# Patient Record
Sex: FEMALE | Race: WHITE | NOT HISPANIC OR LATINO | Employment: OTHER | URBAN - METROPOLITAN AREA
[De-identification: names, ages, dates, MRNs, and addresses within clinical notes are randomized per-mention and may not be internally consistent; named-entity substitution may affect disease eponyms.]

---

## 2020-10-29 ENCOUNTER — OFFICE VISIT (OUTPATIENT)
Dept: ORTHOPEDIC SURGERY | Facility: CLINIC | Age: 65
End: 2020-10-29

## 2020-10-29 VITALS — BODY MASS INDEX: 25.83 KG/M2 | WEIGHT: 136.8 LBS | HEIGHT: 61 IN | HEART RATE: 88 BPM | OXYGEN SATURATION: 99 %

## 2020-10-29 DIAGNOSIS — M65.332 TRIGGER MIDDLE FINGER OF LEFT HAND: ICD-10-CM

## 2020-10-29 DIAGNOSIS — M18.0 PRIMARY OSTEOARTHRITIS OF BOTH FIRST CARPOMETACARPAL JOINTS: ICD-10-CM

## 2020-10-29 DIAGNOSIS — M65.4 DE QUERVAIN'S TENOSYNOVITIS, BILATERAL: Primary | ICD-10-CM

## 2020-10-29 DIAGNOSIS — M65.312 TRIGGER FINGER OF LEFT THUMB: ICD-10-CM

## 2020-10-29 DIAGNOSIS — M65.311 TRIGGER FINGER OF RIGHT THUMB: ICD-10-CM

## 2020-10-29 PROCEDURE — 99204 OFFICE O/P NEW MOD 45 MIN: CPT | Performed by: PHYSICIAN ASSISTANT

## 2020-10-29 PROCEDURE — 20550 NJX 1 TENDON SHEATH/LIGAMENT: CPT | Performed by: PHYSICIAN ASSISTANT

## 2020-10-29 RX ORDER — LISINOPRIL 20 MG/1
20 TABLET ORAL DAILY
COMMUNITY
Start: 2020-08-17 | End: 2023-02-01 | Stop reason: SDUPTHER

## 2020-10-29 RX ADMIN — TRIAMCINOLONE ACETONIDE 20 MG: 40 INJECTION, SUSPENSION INTRA-ARTICULAR; INTRAMUSCULAR at 09:56

## 2020-10-29 RX ADMIN — LIDOCAINE HYDROCHLORIDE 0.5 ML: 10 INJECTION, SOLUTION EPIDURAL; INFILTRATION; INTRACAUDAL; PERINEURAL at 09:56

## 2020-10-29 RX ADMIN — TRIAMCINOLONE ACETONIDE 20 MG: 40 INJECTION, SUSPENSION INTRA-ARTICULAR; INTRAMUSCULAR at 10:12

## 2020-10-29 RX ADMIN — LIDOCAINE HYDROCHLORIDE 1 ML: 10 INJECTION, SOLUTION EPIDURAL; INFILTRATION; INTRACAUDAL; PERINEURAL at 10:12

## 2020-11-02 RX ORDER — TRIAMCINOLONE ACETONIDE 40 MG/ML
20 INJECTION, SUSPENSION INTRA-ARTICULAR; INTRAMUSCULAR
Status: COMPLETED | OUTPATIENT
Start: 2020-10-29 | End: 2020-10-29

## 2020-11-02 RX ORDER — LIDOCAINE HYDROCHLORIDE 10 MG/ML
0.5 INJECTION, SOLUTION EPIDURAL; INFILTRATION; INTRACAUDAL; PERINEURAL
Status: COMPLETED | OUTPATIENT
Start: 2020-10-29 | End: 2020-10-29

## 2020-11-02 RX ORDER — LIDOCAINE HYDROCHLORIDE 10 MG/ML
1 INJECTION, SOLUTION EPIDURAL; INFILTRATION; INTRACAUDAL; PERINEURAL
Status: COMPLETED | OUTPATIENT
Start: 2020-10-29 | End: 2020-10-29

## 2022-08-02 ENCOUNTER — TELEPHONE (OUTPATIENT)
Dept: FAMILY MEDICINE CLINIC | Facility: CLINIC | Age: 67
End: 2022-08-02

## 2022-08-02 NOTE — TELEPHONE ENCOUNTER
Caller: [facility]    Relationship:     Best call back number: [phone]      What form or medical record are you requesting: COPY OF PT EKG     Who is requesting this form or medical record from you: [facility]    How would you like to receive the form or medical records (pick-up, mail, fax):   If fax, what is the fax number: [phone] (YSABEL)  If mail, what is the address:   If pick-up, provide patient with address and location details    Timeframe paperwork needed: ASAP    Additional notes:   PT HAS SURGERY ON 8/9 AND REQUEST COPY OF EKG PRIOR TO SURGERY

## 2023-02-01 RX ORDER — LISINOPRIL 20 MG/1
20 TABLET ORAL DAILY
Qty: 90 TABLET | Refills: 0 | Status: SHIPPED | OUTPATIENT
Start: 2023-02-01

## 2023-09-05 ENCOUNTER — TELEPHONE (OUTPATIENT)
Dept: FAMILY MEDICINE CLINIC | Facility: CLINIC | Age: 68
End: 2023-09-05
Payer: MEDICARE

## 2023-09-05 RX ORDER — LISINOPRIL 20 MG/1
20 TABLET ORAL DAILY
Qty: 90 TABLET | Refills: 0 | Status: SHIPPED | OUTPATIENT
Start: 2023-09-05

## 2023-09-05 NOTE — TELEPHONE ENCOUNTER
Caller: Sowmya Bacon    Relationship: Self    Best call back number: [phone]       What was the call regarding: PATIENT IS RUNNING OUT OF LISINOPRIL. REQUESTING TEMPORARY REFILL UNTIL APPOINTMENT IN OCTOBER. CALL IF THERE ARE ANY ISSUES.     [id number] - [place], [place] - [address] AT NEXT TO [facility] - [phone]  - [phone]     Is it okay if the provider responds through MyChart: NO

## 2023-10-10 ENCOUNTER — OFFICE VISIT (OUTPATIENT)
Dept: FAMILY MEDICINE CLINIC | Facility: CLINIC | Age: 68
End: 2023-10-10
Payer: MEDICARE

## 2023-10-10 VITALS
DIASTOLIC BLOOD PRESSURE: 86 MMHG | TEMPERATURE: 97.9 F | HEIGHT: 61 IN | HEART RATE: 76 BPM | WEIGHT: 138.5 LBS | BODY MASS INDEX: 26.15 KG/M2 | OXYGEN SATURATION: 99 % | RESPIRATION RATE: 18 BRPM | SYSTOLIC BLOOD PRESSURE: 140 MMHG

## 2023-10-10 DIAGNOSIS — Z78.0 POSTMENOPAUSAL: ICD-10-CM

## 2023-10-10 DIAGNOSIS — K21.9 GASTROESOPHAGEAL REFLUX DISEASE WITHOUT ESOPHAGITIS: ICD-10-CM

## 2023-10-10 DIAGNOSIS — R35.1 NOCTURIA: ICD-10-CM

## 2023-10-10 DIAGNOSIS — E55.9 VITAMIN D DEFICIENCY: ICD-10-CM

## 2023-10-10 DIAGNOSIS — J30.1 SEASONAL ALLERGIC RHINITIS DUE TO POLLEN: ICD-10-CM

## 2023-10-10 DIAGNOSIS — Z23 NEED FOR VACCINATION: ICD-10-CM

## 2023-10-10 DIAGNOSIS — I10 ESSENTIAL HYPERTENSION: ICD-10-CM

## 2023-10-10 DIAGNOSIS — Z00.01 ENCOUNTER FOR GENERAL ADULT MEDICAL EXAMINATION WITH ABNORMAL FINDINGS: Primary | ICD-10-CM

## 2023-10-10 DIAGNOSIS — E66.3 OVERWEIGHT (BMI 25.0-29.9): ICD-10-CM

## 2023-10-10 DIAGNOSIS — M54.50 CHRONIC MIDLINE LOW BACK PAIN WITHOUT SCIATICA: ICD-10-CM

## 2023-10-10 DIAGNOSIS — Z12.11 COLON CANCER SCREENING: ICD-10-CM

## 2023-10-10 DIAGNOSIS — E78.2 MIXED HYPERLIPIDEMIA: ICD-10-CM

## 2023-10-10 DIAGNOSIS — R53.83 OTHER FATIGUE: ICD-10-CM

## 2023-10-10 DIAGNOSIS — G89.29 CHRONIC MIDLINE LOW BACK PAIN WITHOUT SCIATICA: ICD-10-CM

## 2023-10-10 DIAGNOSIS — Z13.29 THYROID DISORDER SCREENING: ICD-10-CM

## 2023-10-10 DIAGNOSIS — R73.9 HYPERGLYCEMIA: ICD-10-CM

## 2023-10-10 RX ORDER — MONTELUKAST SODIUM 10 MG/1
10 TABLET ORAL NIGHTLY
Qty: 90 TABLET | Refills: 0 | Status: SHIPPED | OUTPATIENT
Start: 2023-10-10

## 2023-10-10 RX ORDER — LISINOPRIL AND HYDROCHLOROTHIAZIDE 20; 12.5 MG/1; MG/1
1 TABLET ORAL DAILY
Qty: 90 TABLET | Refills: 1 | Status: SHIPPED | OUTPATIENT
Start: 2023-10-10

## 2023-10-10 RX ORDER — CETIRIZINE HYDROCHLORIDE 10 MG/1
10 TABLET ORAL DAILY
COMMUNITY

## 2023-10-10 RX ORDER — MELATONIN
1000 DAILY
COMMUNITY

## 2023-10-10 NOTE — PROGRESS NOTES
Venipuncture Blood Specimen Collection  Venipuncture performed in left arm by Danielle Blackburn MA with good hemostasis. Patient tolerated the procedure well without complications.   10/10/23   Danielle Blackburn MA

## 2023-10-10 NOTE — ASSESSMENT & PLAN NOTE
Modestly overweight, still quite a healthy weight as per body frame.  Nonetheless reinforced importance of healthy diet, exercise and even modest weight loss.

## 2023-10-10 NOTE — ASSESSMENT & PLAN NOTE
Seasonal pattern with modest benefit over-the-counter antihistamine, typically Zyrtec.  Some breakthrough symptoms, I have added Singulair 10 mg tablet daily to regimen and she could also use Flonase although sometimes it is irritating.  He also all 3 together for the next couple weeks, then as needed.  Additional benefit of saline spray, nasal flushing.  Advise concerns.

## 2023-10-10 NOTE — ASSESSMENT & PLAN NOTE
Long-standing pattern.  Historically responsive to preventative measures with once monthly chiropractor, heating pack, anti-inflammatories was not satisfactory as of late 2021 and in 2022, with progression of symptoms.  Ultimately evaluation by Dr. Edmundo Mckeon at [facility] who performed MRI with abnormalities and ultimately perform surgical L4/L5 fusion on 8/9/2022.  He has done well since, advise recurrence.

## 2023-10-10 NOTE — ASSESSMENT & PLAN NOTE
Blood work last 7/13/2019, tried multiple times in the interim, obtained finally today 10/10/2023 with managed per results.  No recent Pap smear, patient still plans to set up appointment with gynecologist Dr. Cullen for Pap smear, I have discussed this would likely be her final if negative.  Mammogram recommended, patient will consider and if she pursue she will do at the same day she has her DEXA scan.  DEXA scan 11/20/2016 at [facility] with osteopenia not requiring treatment, patient agreeable to repeating today as of 10/10/2023.  TDaP vaccine given 6/30/2022, ABN completed.  Pneumococcal 13 valent vaccine given 4/8/2019, pneumococcal 23 valent vaccine given 12/14/2020.  Recommend 2 part shingles vaccine.  Flu vaccine declined.  Colonoscopy last 2007 by Dr. Jimenez, which is normal with 10 year followup.  Patient subsequently declined colonoscopy, but normal Cologuard 12/28/2020, repeat 3 years pending soon and schedule today 10/10/2023.

## 2023-10-10 NOTE — ASSESSMENT & PLAN NOTE
9/28/2019 total cholesterol 177, triglycerides 35, HDL 79, LDL 91.  Comparison 7/13/2019 with total cholesterol 321, triglycerides 49, HDL 78, .  Previous pravastatin 20 mg daily was being taken intermittently but with this notable elevation, switched atorvastatin 40 mg daily which has had excellent response as noted above on 9/28/2019, but she has discontinued, instead using over-the-counter supplement.  Recheck planned for couple years but not yet obtained, recheck finally today on 10/10/2023, still elevated we will plan to resume atorvastatin.   Recommendation healthy dietary intake, activity level, weight loss, addition discuss.

## 2023-10-10 NOTE — ASSESSMENT & PLAN NOTE
Continue on vitamin D 1000 units daily OTC.  No recent measurement to compare, we will check with screening blood work.  History of normal DEXA scan in 2007, with osteopenia pattern not requiring treatment 11/22/2016 as ordered by gynecology at [facility].  Management per results.

## 2023-10-10 NOTE — ASSESSMENT & PLAN NOTE
Infrequent flare without any sticking sensation in the throat. uses over-the-counter medication with benefit.  Reflux precautions reinforced.  Advise concerns.

## 2023-10-10 NOTE — PROGRESS NOTES
The ABCs of the Annual Wellness Visit  Subsequent Medicare Wellness Visit    Subjective    Sowmya Bacon is a 68 y.o. female who presents for a Subsequent Medicare Wellness Visit.    The following portions of the patient's history were reviewed and   updated as appropriate: allergies, current medications, past family history, past medical history, past social history, past surgical history, and problem list.    Compared to one year ago, the patient feels her physical   health is the same.    Compared to one year ago, the patient feels her mental   health is the same.    Recent Hospitalizations:  She was not admitted to the hospital during the last year.       Current Medical Providers:  Patient Care Team:  Gino Boggs MD as PCP - General (Internal Medicine)    Outpatient Medications Prior to Visit   Medication Sig Dispense Refill    lisinopril (PRINIVIL,ZESTRIL) 20 MG tablet Take 1 tablet by mouth Daily. 90 tablet 0    cetirizine (zyrTEC) 10 MG tablet Take 1 tablet by mouth Daily.      Cholecalciferol 25 MCG (1000 UT) tablet Take 1 tablet by mouth Daily.       No facility-administered medications prior to visit.       No opioid medication identified on active medication list. I have reviewed chart for other potential  high risk medication/s and harmful drug interactions in the elderly.        Aspirin is not on active medication list.  Aspirin use is not indicated based on review of current medical condition/s. Risk of harm outweighs potential benefits.  .    Patient Active Problem List   Diagnosis    Encounter for general adult medical examination with abnormal findings    Essential hypertension    Mixed hyperlipidemia    Seasonal allergic rhinitis due to pollen    Chronic midline low back pain without sciatica    Gastroesophageal reflux disease without esophagitis    Vitamin D deficiency    Need for vaccination    Colon cancer screening    Overweight (BMI 25.0-29.9)     Advance Care Planning   Advance Care  "Planning     Advance Directive is not on file.  ACP discussion was held with the patient during this visit. Patient has an advance directive (not in EMR), copy requested.     Objective    Vitals:    10/10/23 0929   BP: 140/86   BP Location: Left arm   Patient Position: Sitting   Cuff Size: Adult   Pulse: 76   Resp: 18   Temp: 97.9 øF (36.6 øC)   TempSrc: Temporal   SpO2: 99%   Weight: 62.8 kg (138 lb 8 oz)   Height: 154.9 cm (61\")     Estimated body mass index is 26.17 kg/mý as calculated from the following:    Height as of this encounter: 154.9 cm (61\").    Weight as of this encounter: 62.8 kg (138 lb 8 oz).    BMI is >= 25 and <30. (Overweight) The following options were offered after discussion;: weight loss educational material (shared in after visit summary), exercise counseling/recommendations, and nutrition counseling/recommendations      Does the patient have evidence of cognitive impairment? No          HEALTH RISK ASSESSMENT    Smoking Status:  Social History     Tobacco Use   Smoking Status Never   Smokeless Tobacco Never     Alcohol Consumption:  Social History     Substance and Sexual Activity   Alcohol Use Yes    Comment: occ     Fall Risk Screen:    STEADI Fall Risk Assessment was completed, and patient is at MODERATE risk for falls. Assessment completed on:10/10/2023    Depression Screening:      10/10/2023     9:29 AM   PHQ-2/PHQ-9 Depression Screening   Little Interest or Pleasure in Doing Things 0-->not at all   Feeling Down, Depressed or Hopeless 0-->not at all   PHQ-9: Brief Depression Severity Measure Score 0       Health Habits and Functional and Cognitive Screening:      10/10/2023     9:32 AM   Functional & Cognitive Status   Do you have difficulty preparing food and eating? No   Do you have difficulty bathing yourself, getting dressed or grooming yourself? No   Do you have difficulty using the toilet? No   Do you have difficulty moving around from place to place? No   Do you have trouble " with steps or getting out of a bed or a chair? No   Current Diet Well Balanced Diet   Dental Exam Up to date   Eye Exam Up to date   Exercise (times per week) 7 times per week   Current Exercises Include Walking;Weightlifting   Do you need help using the phone?  No   Are you deaf or do you have serious difficulty hearing?  No   Do you need help to go to places out of walking distance? No   Do you need help shopping? No   Do you need help preparing meals?  No   Do you need help with housework?  No   Do you need help with laundry? No   Do you need help taking your medications? No   Do you need help managing money? No   Do you ever drive or ride in a car without wearing a seat belt? No       Age-appropriate Screening Schedule:  Refer to the list below for future screening recommendations based on patient's age, sex and/or medical conditions. Orders for these recommended tests are listed in the plan section. The patient has been provided with a written plan.    Health Maintenance   Topic Date Due    MAMMOGRAM  Never done    DXA SCAN  Never done    BMI FOLLOWUP  Never done    COVID-19 Vaccine (1) Never done    ZOSTER VACCINE (1 of 2) Never done    HEPATITIS C SCREENING  Never done    INFLUENZA VACCINE  Never done    LIPID PANEL  Never done    COLORECTAL CANCER SCREENING  12/28/2023    ANNUAL WELLNESS VISIT  10/10/2024    TDAP/TD VACCINES (3 - Td or Tdap) 06/30/2032    Pneumococcal Vaccine 65+  Completed                  CMS Preventative Services Quick Reference  Risk Factors Identified During Encounter  None Identified  The above risks/problems have been discussed with the patient.  Pertinent information has been shared with the patient in the After Visit Summary.  An After Visit Summary and PPPS were made available to the patient.    Follow Up:   Next Medicare Wellness visit to be scheduled in 1 year.       Additional E&M Note during same encounter follows:  Patient has multiple medical problems which are significant  "and separately identifiable that require additional work above and beyond the Medicare Wellness Visit.      Chief Complaint  Annual Exam    Subjective        HPI  Sowmya aBcon is also being seen today for wellness visit in addition complete physical exam and follow-up regarding multimedical problems.  Regarding hypertension, blood pressure not checked at home of any regularity, but in clinic today on recheck it still 140/84.  As such we discussed titrating up her medicine modestly and having her check more regular at home.  With hyperlipidemia pattern, she is off medicine with the last time recommended to recheck cholesterol and consider restarting the atorvastatin, but she never had that blood work done.  As such we will proceed today and initiate as appropriate.  Nonetheless with comorbid mild overweight pattern, reinforced importance of healthy diet, exercise and weight loss.  Regarding lower back pain, status post August 2022 fusion of the L4/L5 region with good response per Dr. Mckeon in [place].  No radiculopathy pattern.  Periodic aches and pains and sense of tightness but much improved overall.  Reflux symptoms periodically flare with over-the-counter use of medication but not too bothersome.  She does have notable increase in congestion drainage, sneezing, for which she is using over-the-counter antihistamine but nothing else for allergy type symptoms.  Regarding vitamin D deficiency she is still taking her medicine of some regularity, we will recheck with blood work.         Objective   Vital Signs:  /86 (BP Location: Left arm, Patient Position: Sitting, Cuff Size: Adult)   Pulse 76   Temp 97.9 øF (36.6 øC) (Temporal)   Resp 18   Ht 154.9 cm (61\")   Wt 62.8 kg (138 lb 8 oz)   SpO2 99%   BMI 26.17 kg/mý     Physical Exam  Constitutional:       General: She is not in acute distress.     Appearance: Normal appearance. She is not ill-appearing, toxic-appearing or diaphoretic.   HENT:      Head: " Normocephalic and atraumatic.      Right Ear: Ear canal and external ear normal.      Left Ear: Ear canal and external ear normal.      Ears:      Comments: Mild fluid behind the TMs bilaterally, otherwise clear     Nose: Rhinorrhea present.      Comments: Mild to moderate clear rhinorrhea     Mouth/Throat:      Mouth: Mucous membranes are moist.      Pharynx: Oropharynx is clear. No oropharyngeal exudate or posterior oropharyngeal erythema.   Eyes:      Extraocular Movements: Extraocular movements intact.      Conjunctiva/sclera: Conjunctivae normal.      Pupils: Pupils are equal, round, and reactive to light.   Neck:      Vascular: No carotid bruit.   Cardiovascular:      Rate and Rhythm: Normal rate and regular rhythm.      Pulses: Normal pulses.      Heart sounds: Normal heart sounds. No murmur heard.     No friction rub. No gallop.   Pulmonary:      Effort: Pulmonary effort is normal. No respiratory distress.      Breath sounds: Normal breath sounds. No stridor. No wheezing.   Abdominal:      General: Abdomen is flat. Bowel sounds are normal. There is no distension.      Palpations: Abdomen is soft. There is no mass.      Tenderness: There is no abdominal tenderness. There is no guarding or rebound.      Hernia: No hernia is present.   Genitourinary:     Comments: Patient defers as obtained through gynecology  Musculoskeletal:      Cervical back: Neck supple. No tenderness.      Right lower leg: No edema.      Left lower leg: No edema.   Lymphadenopathy:      Cervical: No cervical adenopathy.   Skin:     General: Skin is warm and dry.      Capillary Refill: Capillary refill takes less than 2 seconds.   Neurological:      General: No focal deficit present.      Mental Status: She is alert and oriented to person, place, and time. Mental status is at baseline.   Psychiatric:         Mood and Affect: Mood normal.         Behavior: Behavior normal.         Thought Content: Thought content normal.                   ECG 12 Lead    Date/Time: 10/10/2023 10:44 AM  Performed by: Gino Boggs MD    Authorized by: Gino Boggs MD  Comparison: compared with previous ECG from 6/30/2022  Similar to previous ECG  Rhythm: sinus rhythm  Rate: normal  Conduction: conduction normal  ST Segments: ST segments normal  T Waves: T waves normal  QRS axis: normal  Other: no other findings    Clinical impression: normal ECG  Comments: Computer not recognizing P waves but they are present and normal intervals, as such this is actually a normal sinus rhythm, and unchanged compared to 6/30/2022 EKG.              Assessment and Plan   Diagnoses and all orders for this visit:    1. Encounter for general adult medical examination with abnormal findings (Primary)  Assessment & Plan:  Blood work last 7/13/2019, tried multiple times in the interim, obtained finally today 10/10/2023 with managed per results.  No recent Pap smear, patient still plans to set up appointment with gynecologist Dr. Cullen for Pap smear, I have discussed this would likely be her final if negative.  Mammogram recommended, patient will consider and if she pursue she will do at the same day she has her DEXA scan.  DEXA scan 11/20/2016 at [facility] with osteopenia not requiring treatment, patient agreeable to repeating today as of 10/10/2023.  TDaP vaccine given 6/30/2022, ABN completed.  Pneumococcal 13 valent vaccine given 4/8/2019, pneumococcal 23 valent vaccine given 12/14/2020.  Recommend 2 part shingles vaccine.  Flu vaccine declined.  Colonoscopy last 2007 by Dr. Jimenez, which is normal with 10 year followup.  Patient subsequently declined colonoscopy, but normal Cologuard 12/28/2020, repeat 3 years pending soon and schedule today 10/10/2023.       2. Chronic midline low back pain without sciatica  Assessment & Plan:  Long-standing pattern.  Historically responsive to preventative measures with once monthly chiropractor, heating pack, anti-inflammatories  was not satisfactory as of late 2021 and in 2022, with progression of symptoms.  Ultimately evaluation by Dr. Edmundo Mckeon at [facility] who performed MRI with abnormalities and ultimately perform surgical L4/L5 fusion on 8/9/2022.  He has done well since, advise recurrence.      3. Essential hypertension  Assessment & Plan:  Current lisinopril 20 mg daily as but blood pressure modestly increased, I would like to return to lisinopril 20/HCTZ 12.5 mg daily although there was a modest lower sodium in the past on this medication.  EKG non-concerning on 10/10/2023, with notable computer not recognizing the P waves but they are present, and unchanged compared to 6/30/2022, 12/14/2020, 4/8/2019.  No associated lightheadedness, dizziness, chest pain or palpitation.      Orders:  -     ECG 12 Lead  -     lisinopril-hydrochlorothiazide (PRINZIDE,ZESTORETIC) 20-12.5 MG per tablet; Take 1 tablet by mouth Daily.  Dispense: 90 tablet; Refill: 1    4. Mixed hyperlipidemia  Assessment & Plan:  9/28/2019 total cholesterol 177, triglycerides 35, HDL 79, LDL 91.  Comparison 7/13/2019 with total cholesterol 321, triglycerides 49, HDL 78, .  Previous pravastatin 20 mg daily was being taken intermittently but with this notable elevation, switched atorvastatin 40 mg daily which has had excellent response as noted above on 9/28/2019, but she has discontinued, instead using over-the-counter supplement.  Recheck planned for couple years but not yet obtained, recheck finally today on 10/10/2023, still elevated we will plan to resume atorvastatin.   Recommendation healthy dietary intake, activity level, weight loss, addition discuss.    Orders:  -     Comprehensive Metabolic Panel; Future  -     Lipid Panel; Future  -     Lipid Panel  -     Comprehensive Metabolic Panel    5. Seasonal allergic rhinitis due to pollen  Assessment & Plan:  Seasonal pattern with modest benefit over-the-counter antihistamine, typically Zyrtec.   Some breakthrough symptoms, I have added Singulair 10 mg tablet daily to regimen and she could also use Flonase although sometimes it is irritating.  He also all 3 together for the next couple weeks, then as needed.  Additional benefit of saline spray, nasal flushing.  Advise concerns.    Orders:  -     montelukast (Singulair) 10 MG tablet; Take 1 tablet by mouth Every Night.  Dispense: 90 tablet; Refill: 0    6. Gastroesophageal reflux disease without esophagitis  Assessment & Plan:  Infrequent flare without any sticking sensation in the throat. uses over-the-counter medication with benefit.  Reflux precautions reinforced.  Advise concerns.      7. Vitamin D deficiency  Assessment & Plan:  Continue on vitamin D 1000 units daily OTC.  No recent measurement to compare, we will check with screening blood work.  History of normal DEXA scan in 2007, with osteopenia pattern not requiring treatment 11/22/2016 as ordered by gynecology at [facility].  Management per results.    Orders:  -     Vitamin D,25-Hydroxy; Future  -     Vitamin D,25-Hydroxy    8. Thyroid disorder screening  -     TSH Rfx On Abnormal To Free T4; Future  -     TSH Rfx On Abnormal To Free T4    9. Other fatigue  -     CBC & Differential; Future  -     Hepatitis C Antibody; Future  -     HIV-1 / O / 2 Ag / Antibody; Future  -     HIV-1 / O / 2 Ag / Antibody  -     Hepatitis C Antibody  -     CBC & Differential    10. Nocturia  -     Urinalysis With Culture If Indicated - Urine, Clean Catch; Future  -     Urinalysis With Culture If Indicated - Urine, Clean Catch    11. Hyperglycemia  -     Hemoglobin A1c; Future  -     Hemoglobin A1c    12. Need for vaccination    13. Colon cancer screening  Assessment & Plan:  Colonoscopy 2007 by Dr. Jimenez normal 10-year follow-up, then Cologuard - 12/28/2020, repeat to be scheduled as of 10/9/2023.    Orders:  -     Cologuard - Stool, Per Rectum; Future    14. Postmenopausal  -     DEXA Bone Density Axial;  Future    15. Overweight (BMI 25.0-29.9)  Assessment & Plan:  Modestly overweight, still quite a healthy weight as per body frame.  Nonetheless reinforced importance of healthy diet, exercise and even modest weight loss.               Follow Up   Return in about 3 months (around 1/10/2024) for Next scheduled follow up.  Patient was given instructions and counseling regarding her condition or for health maintenance advice. Please see specific information pulled into the AVS if appropriate.

## 2023-10-10 NOTE — PATIENT INSTRUCTIONS
Health Maintenance, Female  Adopting a healthy lifestyle and getting preventive care can go a long way to promote health and wellness. Talk with your health care provider about what schedule of regular examinations is right for you. This is a good chance for you to check in with your provider about disease prevention and staying healthy.  In between checkups, there are plenty of things you can do on your own. Experts have done a lot of research about which lifestyle changes and preventive measures are most likely to keep you healthy. Ask your health care provider for more information.  Weight and diet  Eat a healthy diet  Be sure to include plenty of vegetables, fruits, low-fat dairy products, and lean protein.  Do not eat a lot of foods high in solid fats, added sugars, or salt.  Get regular exercise. This is one of the most important things you can do for your health.  Most adults should exercise for at least 150 minutes each week. The exercise should increase your heart rate and make you sweat (moderate-intensity exercise).  Most adults should also do strengthening exercises at least twice a week. This is in addition to the moderate-intensity exercise.     Maintain a healthy weight  Body mass index (BMI) is a measurement that can be used to identify possible weight problems. It estimates body fat based on height and weight. Your health care provider can help determine your BMI and help you achieve or maintain a healthy weight.  For females 20 years of age and older:  A BMI below 18.5 is considered underweight.  A BMI of 18.5 to 24.9 is normal.  A BMI of 25 to 29.9 is considered overweight.  A BMI of 30 and above is considered obese.     Watch levels of cholesterol and blood lipids  You should start having your blood tested for lipids and cholesterol at 20 years of age, then have this test every 5 years.  You may need to have your cholesterol levels checked more often if:  Your lipid or cholesterol levels are  high.  You are older than 50 years of age.  You are at high risk for heart disease.     Cancer screening  Lung Cancer  Lung cancer screening is recommended for adults 55-80 years old who are at high risk for lung cancer because of a history of smoking.  A yearly low-dose CT scan of the lungs is recommended for people who:  Currently smoke.  Have quit within the past 15 years.  Have at least a 30-pack-year history of smoking. A pack year is smoking an average of one pack of cigarettes a day for 1 year.  Yearly screening should continue until it has been 15 years since you quit.  Yearly screening should stop if you develop a health problem that would prevent you from having lung cancer treatment.     Breast Cancer  Practice breast self-awareness. This means understanding how your breasts normally appear and feel.  It also means doing regular breast self-exams. Let your health care provider know about any changes, no matter how small.  If you are in your 20s or 30s, you should have a clinical breast exam (CBE) by a health care provider every 1-3 years as part of a regular health exam.  If you are 40 or older, have a CBE every year. Also consider having a breast X-ray (mammogram) every year.  If you have a family history of breast cancer, talk to your health care provider about genetic screening.  If you are at high risk for breast cancer, talk to your health care provider about having an MRI and a mammogram every year.  Breast cancer gene (BRCA) assessment is recommended for women who have family members with BRCA-related cancers. BRCA-related cancers include:  Breast.  Ovarian.  Tubal.  Peritoneal cancers.  Results of the assessment will determine the need for genetic counseling and BRCA1 and BRCA2 testing.     Cervical Cancer  Your health care provider may recommend that you be screened regularly for cancer of the pelvic organs (ovaries, uterus, and vagina). This screening involves a pelvic examination, including  checking for microscopic changes to the surface of your cervix (Pap test). You may be encouraged to have this screening done every 3 years, beginning at age 21.  For women ages 30-65, health care providers may recommend pelvic exams and Pap testing every 3 years, or they may recommend the Pap and pelvic exam, combined with testing for human papilloma virus (HPV), every 5 years. Some types of HPV increase your risk of cervical cancer. Testing for HPV may also be done on women of any age with unclear Pap test results.  Other health care providers may not recommend any screening for nonpregnant women who are considered low risk for pelvic cancer and who do not have symptoms. Ask your health care provider if a screening pelvic exam is right for you.  If you have had past treatment for cervical cancer or a condition that could lead to cancer, you need Pap tests and screening for cancer for at least 20 years after your treatment. If Pap tests have been discontinued, your risk factors (such as having a new sexual partner) need to be reassessed to determine if screening should resume. Some women have medical problems that increase the chance of getting cervical cancer. In these cases, your health care provider may recommend more frequent screening and Pap tests.     Colorectal Cancer  This type of cancer can be detected and often prevented.  Routine colorectal cancer screening usually begins at 50 years of age and continues through 75 years of age.  Your health care provider may recommend screening at an earlier age if you have risk factors for colon cancer.  Your health care provider may also recommend using home test kits to check for hidden blood in the stool.  A small camera at the end of a tube can be used to examine your colon directly (sigmoidoscopy or colonoscopy). This is done to check for the earliest forms of colorectal cancer.  Routine screening usually begins at age 50.  Direct examination of the colon should  be repeated every 5-10 years through 75 years of age. However, you may need to be screened more often if early forms of precancerous polyps or small growths are found.     Skin Cancer  Check your skin from head to toe regularly.  Tell your health care provider about any new moles or changes in moles, especially if there is a change in a mole's shape or color.  Also tell your health care provider if you have a mole that is larger than the size of a pencil eraser.  Always use sunscreen. Apply sunscreen liberally and repeatedly throughout the day.  Protect yourself by wearing long sleeves, pants, a wide-brimmed hat, and sunglasses whenever you are outside.     Heart disease, diabetes, and high blood pressure  High blood pressure causes heart disease and increases the risk of stroke. High blood pressure is more likely to develop in:  People who have blood pressure in the high end of the normal range (130-139/85-89 mm Hg).  People who are overweight or obese.  People who are .  If you are 18-39 years of age, have your blood pressure checked every 3-5 years. If you are 40 years of age or older, have your blood pressure checked every year. You should have your blood pressure measured twice--once when you are at a hospital or clinic, and once when you are not at a hospital or clinic. Record the average of the two measurements. To check your blood pressure when you are not at a hospital or clinic, you can use:  An automated blood pressure machine at a pharmacy.  A home blood pressure monitor.  If you are between 55 years and 79 years old, ask your health care provider if you should take aspirin to prevent strokes.  Have regular diabetes screenings. This involves taking a blood sample to check your fasting blood sugar level.  If you are at a normal weight and have a low risk for diabetes, have this test once every three years after 45 years of age.  If you are overweight and have a high risk for diabetes,  consider being tested at a younger age or more often.  Preventing infection  Hepatitis B  If you have a higher risk for hepatitis B, you should be screened for this virus. You are considered at high risk for hepatitis B if:  You were born in a country where hepatitis B is common. Ask your health care provider which countries are considered high risk.  Your parents were born in a high-risk country, and you have not been immunized against hepatitis B (hepatitis B vaccine).  You have HIV or AIDS.  You use needles to inject street drugs.  You live with someone who has hepatitis B.  You have had sex with someone who has hepatitis B.  You get hemodialysis treatment.  You take certain medicines for conditions, including cancer, organ transplantation, and autoimmune conditions.     Hepatitis C  Blood testing is recommended for:  Everyone born from 1945 through 1965.  Anyone with known risk factors for hepatitis C.     Sexually transmitted infections (STIs)  You should be screened for sexually transmitted infections (STIs) including gonorrhea and chlamydia if:  You are sexually active and are younger than 24 years of age.  You are older than 24 years of age and your health care provider tells you that you are at risk for this type of infection.  Your sexual activity has changed since you were last screened and you are at an increased risk for chlamydia or gonorrhea. Ask your health care provider if you are at risk.  If you do not have HIV, but are at risk, it may be recommended that you take a prescription medicine daily to prevent HIV infection. This is called pre-exposure prophylaxis (PrEP). You are considered at risk if:  You are sexually active and do not regularly use condoms or know the HIV status of your partner(s).  You take drugs by injection.  You are sexually active with a partner who has HIV.     Talk with your health care provider about whether you are at high risk of being infected with HIV. If you choose to  begin PrEP, you should first be tested for HIV. You should then be tested every 3 months for as long as you are taking PrEP.  Pregnancy  If you are premenopausal and you may become pregnant, ask your health care provider about preconception counseling.  If you may become pregnant, take 400 to 800 micrograms (mcg) of folic acid every day.  If you want to prevent pregnancy, talk to your health care provider about birth control (contraception).  Osteoporosis and menopause  Osteoporosis is a disease in which the bones lose minerals and strength with aging. This can result in serious bone fractures. Your risk for osteoporosis can be identified using a bone density scan.  If you are 65 years of age or older, or if you are at risk for osteoporosis and fractures, ask your health care provider if you should be screened.  Ask your health care provider whether you should take a calcium or vitamin D supplement to lower your risk for osteoporosis.  Menopause may have certain physical symptoms and risks.  Hormone replacement therapy may reduce some of these symptoms and risks.  Talk to your health care provider about whether hormone replacement therapy is right for you.  Follow these instructions at home:  Schedule regular health, dental, and eye exams.  Stay current with your immunizations.  Do not use any tobacco products including cigarettes, chewing tobacco, or electronic cigarettes.  If you are pregnant, do not drink alcohol.  If you are breastfeeding, limit how much and how often you drink alcohol.  Limit alcohol intake to no more than 1 drink per day for nonpregnant women. One drink equals 12 ounces of beer, 5 ounces of wine, or 1« ounces of hard liquor.  Do not use street drugs.  Do not share needles.  Ask your health care provider for help if you need support or information about quitting drugs.  Tell your health care provider if you often feel depressed.  Tell your health care provider if you have ever been abused or do  not feel safe at home.  This information is not intended to replace advice given to you by your health care provider. Make sure you discuss any questions you have with your health care provider.  Document Released: 07/02/2012 Document Revised: 05/25/2017 Document Reviewed: 09/20/2016  [employer] Education c 2018 [employer]

## 2023-10-10 NOTE — ASSESSMENT & PLAN NOTE
Current lisinopril 20 mg daily as but blood pressure modestly increased, I would like to return to lisinopril 20/HCTZ 12.5 mg daily although there was a modest lower sodium in the past on this medication.  EKG non-concerning on 10/10/2023, with notable computer not recognizing the P waves but they are present, and unchanged compared to 6/30/2022, 12/14/2020, 4/8/2019.  No associated lightheadedness, dizziness, chest pain or palpitation.

## 2023-10-10 NOTE — ASSESSMENT & PLAN NOTE
Colonoscopy 2007 by Dr. Jimenez normal 10-year follow-up, then Cologuard - 12/28/2020, repeat to be scheduled as of 10/9/2023.

## 2023-10-11 LAB
25(OH)D3+25(OH)D2 SERPL-MCNC: 62 NG/ML (ref 30–100)
ALBUMIN SERPL-MCNC: 4.5 G/DL (ref 3.9–4.9)
ALBUMIN/GLOB SERPL: 2 {RATIO} (ref 1.2–2.2)
ALP SERPL-CCNC: 72 IU/L (ref 44–121)
ALT SERPL-CCNC: 27 IU/L (ref 0–32)
APPEARANCE UR: CLEAR
AST SERPL-CCNC: 28 IU/L (ref 0–40)
BACTERIA #/AREA URNS HPF: NORMAL /[HPF]
BASOPHILS # BLD AUTO: 0 X10E3/UL (ref 0–0.2)
BASOPHILS NFR BLD AUTO: 1 %
BILIRUB SERPL-MCNC: 0.3 MG/DL (ref 0–1.2)
BILIRUB UR QL STRIP: NEGATIVE
BUN SERPL-MCNC: 11 MG/DL (ref 8–27)
BUN/CREAT SERPL: 15 (ref 12–28)
CALCIUM SERPL-MCNC: 9.9 MG/DL (ref 8.7–10.3)
CASTS URNS QL MICRO: NORMAL /LPF
CHLORIDE SERPL-SCNC: 98 MMOL/L (ref 96–106)
CHOLEST SERPL-MCNC: 263 MG/DL (ref 100–199)
CO2 SERPL-SCNC: 21 MMOL/L (ref 20–29)
COLOR UR: YELLOW
CREAT SERPL-MCNC: 0.74 MG/DL (ref 0.57–1)
EGFRCR SERPLBLD CKD-EPI 2021: 87 ML/MIN/1.73
EOSINOPHIL # BLD AUTO: 0.1 X10E3/UL (ref 0–0.4)
EOSINOPHIL NFR BLD AUTO: 2 %
EPI CELLS #/AREA URNS HPF: NORMAL /HPF (ref 0–10)
ERYTHROCYTE [DISTWIDTH] IN BLOOD BY AUTOMATED COUNT: 12.7 % (ref 11.7–15.4)
GLOBULIN SER CALC-MCNC: 2.2 G/DL (ref 1.5–4.5)
GLUCOSE SERPL-MCNC: 94 MG/DL (ref 70–99)
GLUCOSE UR QL STRIP: NEGATIVE
HBA1C MFR BLD: 5.9 % (ref 4.8–5.6)
HCT VFR BLD AUTO: 39.1 % (ref 34–46.6)
HCV IGG SERPL QL IA: NON REACTIVE
HDLC SERPL-MCNC: 72 MG/DL
HGB BLD-MCNC: 12.9 G/DL (ref 11.1–15.9)
HGB UR QL STRIP: NEGATIVE
HIV 1+2 AB+HIV1 P24 AG SERPL QL IA: NON REACTIVE
IMM GRANULOCYTES # BLD AUTO: 0 X10E3/UL (ref 0–0.1)
IMM GRANULOCYTES NFR BLD AUTO: 0 %
KETONES UR QL STRIP: NEGATIVE
LDLC SERPL CALC-MCNC: 176 MG/DL (ref 0–99)
LEUKOCYTE ESTERASE UR QL STRIP: NEGATIVE
LYMPHOCYTES # BLD AUTO: 2 X10E3/UL (ref 0.7–3.1)
LYMPHOCYTES NFR BLD AUTO: 39 %
MCH RBC QN AUTO: 29.7 PG (ref 26.6–33)
MCHC RBC AUTO-ENTMCNC: 33 G/DL (ref 31.5–35.7)
MCV RBC AUTO: 90 FL (ref 79–97)
MICRO URNS: NORMAL
MICRO URNS: NORMAL
MONOCYTES # BLD AUTO: 0.4 X10E3/UL (ref 0.1–0.9)
MONOCYTES NFR BLD AUTO: 8 %
NEUTROPHILS # BLD AUTO: 2.6 X10E3/UL (ref 1.4–7)
NEUTROPHILS NFR BLD AUTO: 50 %
NITRITE UR QL STRIP: NEGATIVE
PH UR STRIP: 6.5 [PH] (ref 5–7.5)
PLATELET # BLD AUTO: 381 X10E3/UL (ref 150–450)
POTASSIUM SERPL-SCNC: 4.6 MMOL/L (ref 3.5–5.2)
PROT SERPL-MCNC: 6.7 G/DL (ref 6–8.5)
PROT UR QL STRIP: NEGATIVE
RBC # BLD AUTO: 4.34 X10E6/UL (ref 3.77–5.28)
RBC #/AREA URNS HPF: NORMAL /HPF (ref 0–2)
SODIUM SERPL-SCNC: 134 MMOL/L (ref 134–144)
SP GR UR STRIP: 1.01 (ref 1–1.03)
TRIGL SERPL-MCNC: 87 MG/DL (ref 0–149)
TSH SERPL DL<=0.005 MIU/L-ACNC: 3.21 UIU/ML (ref 0.45–4.5)
URINALYSIS REFLEX: NORMAL
UROBILINOGEN UR STRIP-MCNC: 0.2 MG/DL (ref 0.2–1)
VLDLC SERPL CALC-MCNC: 15 MG/DL (ref 5–40)
WBC # BLD AUTO: 5.2 X10E3/UL (ref 3.4–10.8)
WBC #/AREA URNS HPF: NORMAL /HPF (ref 0–5)

## 2023-10-12 ENCOUNTER — TELEPHONE (OUTPATIENT)
Dept: FAMILY MEDICINE CLINIC | Facility: CLINIC | Age: 68
End: 2023-10-12
Payer: MEDICARE

## 2023-10-12 DIAGNOSIS — E78.2 MIXED HYPERLIPIDEMIA: Primary | ICD-10-CM

## 2023-10-12 RX ORDER — ATORVASTATIN CALCIUM 40 MG/1
40 TABLET, FILM COATED ORAL DAILY
Qty: 90 TABLET | Refills: 1 | Status: SHIPPED | OUTPATIENT
Start: 2023-10-12

## 2023-10-12 NOTE — TELEPHONE ENCOUNTER
Spoke to patient in regards to labs she is ok with atorvastatin however you did not say what mg. Could you please send this

## 2023-10-12 NOTE — TELEPHONE ENCOUNTER
----- Message from Gino Boggs MD sent at 10/12/2023 11:34 AM EDT -----  Please speak to patient regarding laboratory investigations from 10/10/2023.  Notable results as follows:    Hemoglobin A1c as diabetic screen just in the prediabetic range at 5.9%, with prediabetes is defined by 5.7% - 6.4%.  This range does not require any specific medication initiation, but we should recheck it when she follows up in 3 months time, and reinforced importance of healthy diet, exercise and even modest weight loss.  Total cholesterol elevated 263, triglycerides good at 87, HDL good at 72 but LDL notably elevated at 176.  Comparison 9/28/2019 total cholesterol 177, triglycerides 35, HDL 79, LDL 91 when on atorvastatin 40 mg daily,n 7/13/2019 with total cholesterol 321, triglycerides 49, HDL 78,  when not taking medicine.  As such this notably elevated cholesterol benefits from medication I would like to restart atorvastatin 40 mg daily for cardiovascular risk reduction, patient is agreeable.  Please prescribe, if not advised.  Recheck CMP and lipid panel at follow-up.  Urinalysis negative and nonconcerning.  HIV and hepatitis C virus screening negative.  TSH normal 3.210.  Vitamin D 25-hydroxy level in good range at 62.0.  CMP with normal glucose 94, good kidney function with creatinine 0.74, GFR 87.  Normal electrolytes, normal proteins, normal liver function test.  CBC with differential with normal blood counts.    Related hemoglobin A1c and increased cholesterol, plan to initiate atorvastatin for cholesterol, healthy diet and exercise with mild weight loss for both and recheck hemoglobin A1c, CMP and lipid panel when she follows up in 3 months.

## 2023-11-09 ENCOUNTER — TELEPHONE (OUTPATIENT)
Dept: FAMILY MEDICINE CLINIC | Facility: CLINIC | Age: 68
End: 2023-11-09
Payer: MEDICARE

## 2023-11-09 DIAGNOSIS — Z78.0 POSTMENOPAUSAL: ICD-10-CM

## 2023-11-09 DIAGNOSIS — M81.0 AGE-RELATED OSTEOPOROSIS WITHOUT CURRENT PATHOLOGICAL FRACTURE: Primary | ICD-10-CM

## 2023-11-09 RX ORDER — ALENDRONATE SODIUM 70 MG/1
70 TABLET ORAL
Qty: 12 TABLET | Refills: 3 | Status: SHIPPED | OUTPATIENT
Start: 2023-11-09

## 2023-11-09 NOTE — TELEPHONE ENCOUNTER
Review of results of DEXA scan as obtained at [facility] on 11/6/2023.  Results compared to November 2016.  Findings include the left hip with T score -2.4 which is a 14.5% decrease, the right hip with bone mineral density T score of -2.6 equivalent to 23.2% decrease.  As such she is osteopenic in the left hip and nearly osteoporotic in the right hip.    As such, increased risk of fracture with these findings, such initiate bisphosphonate with alendronate 70 mg weekly, and ensure she is taking vitamin D and calcium daily.

## 2024-01-10 ENCOUNTER — OFFICE VISIT (OUTPATIENT)
Dept: FAMILY MEDICINE CLINIC | Facility: CLINIC | Age: 69
End: 2024-01-10
Payer: MEDICARE

## 2024-01-10 VITALS
WEIGHT: 142.5 LBS | HEART RATE: 70 BPM | HEIGHT: 61 IN | TEMPERATURE: 97.9 F | DIASTOLIC BLOOD PRESSURE: 76 MMHG | SYSTOLIC BLOOD PRESSURE: 122 MMHG | OXYGEN SATURATION: 99 % | BODY MASS INDEX: 26.91 KG/M2

## 2024-01-10 DIAGNOSIS — I10 ESSENTIAL HYPERTENSION: ICD-10-CM

## 2024-01-10 DIAGNOSIS — E78.2 MIXED HYPERLIPIDEMIA: ICD-10-CM

## 2024-01-10 DIAGNOSIS — R73.03 PREDIABETES: Primary | ICD-10-CM

## 2024-01-10 DIAGNOSIS — M81.0 AGE-RELATED OSTEOPOROSIS WITHOUT CURRENT PATHOLOGICAL FRACTURE: ICD-10-CM

## 2024-01-10 LAB
EXPIRATION DATE: NORMAL
GLUCOSE BLDC GLUCOMTR-MCNC: 128 MG/DL (ref 70–130)
HBA1C MFR BLD: 5.7 % (ref 4.5–5.7)
Lab: NORMAL

## 2024-01-10 RX ORDER — ALBUTEROL SULFATE 90 UG/1
AEROSOL, METERED RESPIRATORY (INHALATION)
COMMUNITY

## 2024-01-10 NOTE — PROGRESS NOTES
Venipuncture Blood Specimen Collection  Venipuncture performed in left arm by Melissa Swanson MA with good hemostasis. Patient tolerated the procedure well without complications.   01/10/24   Melissa Swanson MA

## 2024-01-10 NOTE — ASSESSMENT & PLAN NOTE
With modest increase blood pressure 10/10/2023, good response with increase of lisinopril 20 mg daily to lisinopril 20/HCTZ 12.5 mg daily.  Blood pressure checked infrequently at home but when she does it is in the upper 110s to low 120s over 70s with no concerning symptoms.  Continue lisinopril/HCTZ 20/12.5 daily unchanged.  EKG non-concerning on 10/10/2023, with notable computer not recognizing the P waves but they are present, and unchanged compared to 6/30/2022, 12/14/2020, 4/8/2019.  No associated lightheadedness, dizziness, chest pain or palpitation.

## 2024-01-10 NOTE — ASSESSMENT & PLAN NOTE
New diagnosis with hemoglobin A1c 5.9% on 10/10/2023.  Modest improvement diet and exercise with improved pattern to hemoglobin A1c of 5.7% today on 1/10/2024.  Continue healthy diet exercise.  Caution polyuria and polydipsia signs of worsening.  Recheck hemoglobin A1c when she follows up in 6 months.

## 2024-01-10 NOTE — ASSESSMENT & PLAN NOTE
[facility]  Results compared to November 2016.  Findings include the left hip with T score -2.4 which is a 14.5% decrease, the right hip with bone mineral density T score of -2.6 equivalent to 23.2% decrease.  As such she was osteopenic in the left hip and nearly osteoporotic in the right hip.  With increased risk of fracture with these findings, we initiate bisphosphonate with alendronate 70 mg weekly, but she has not been taking.  I reinforced benefits and she will plan to do so now.  Otherwise continue the vitamin D and calcium that she is taking daily.  Recheck DEXA scan in 2 to 3 years.

## 2024-01-10 NOTE — PROGRESS NOTES
Follow Up Office Visit      Date: 01/10/2024   Patient Name: Sowmya Bacon  : 1955   MRN: [id number]     Chief Complaint:    Chief Complaint   Patient presents with    Follow-up       History of Present Illness: Sowmya Bacon is a 68 y.o. female who is here today to follow up with targeted follow-up visit with multimedical problems.  Regarding new prediabetic diagnosis and hyperlipidemia she has been making efforts to eat healthy and be active, with modest changes, difficult to maintain the winter months but still doing a bit better.  No polyuria or polydipsia.  Blood pressure checked infrequently home but when she does she believes it is in the upper 110s to low 120s over 70s with no lightheadedness, dizziness, after modestly increasing her blood pressure regimen at her 2023 visit.  No lightheadedness, dizziness, chest pain or palpitations.  She of note has taking the atorvastatin for cholesterol as prescribed with no concerns.  Regarding osteopenia/osteoporosis from DEXA scan, she has not yet initiated alendronate but we again discussed the benefits of decreasing fracture risk but she will do so, but will also continue to take her vitamin D and calcium.  No other new concerns.    Subjective      Review of Systems:   Review of Systems    I have reviewed the patients family history, social history, past medical history, past surgical history and have updated it as appropriate.     Medications:     Current Outpatient Medications:     albuterol sulfate HFA (ProAir HFA) 108 (90 Base) MCG/ACT inhaler, ProAir HFA 90 mcg/actuation aerosol inhaler  Inhale 2 puffs every 4-6 hours by inhalation route as needed., Disp: , Rfl:     alendronate (Fosamax) 70 MG tablet, Take 1 tablet by mouth Every 7 (Seven) Days., Disp: 12 tablet, Rfl: 3    atorvastatin (Lipitor) 40 MG tablet, Take 1 tablet by mouth Daily., Disp: 90 tablet, Rfl: 1    cetirizine (zyrTEC) 10 MG tablet, Take 1 tablet by mouth Daily., Disp: , Rfl:  "    Cholecalciferol 25 MCG (1000 UT) tablet, Take 1 tablet by mouth Daily., Disp: , Rfl:     lisinopril-hydrochlorothiazide (PRINZIDE,ZESTORETIC) 20-12.5 MG per tablet, Take 1 tablet by mouth Daily., Disp: 90 tablet, Rfl: 1    montelukast (Singulair) 10 MG tablet, Take 1 tablet by mouth Every Night., Disp: 90 tablet, Rfl: 0    Allergies:   No Known Allergies    Objective     Physical Exam: Please see above  Vital Signs:   Vitals:    01/10/24 0836 01/10/24 0848   BP: 130/80 122/76   BP Location: Left arm    Patient Position: Sitting    Cuff Size: Adult    Pulse: 70    Temp: 97.9 °F (36.6 °C)    TempSrc: Temporal    SpO2: 99%    Weight: 64.6 kg (142 lb 8 oz)    Height: 154.9 cm (61\")      Body mass index is 26.93 kg/m².    Physical Exam  Constitutional:       General: She is not in acute distress.     Appearance: Normal appearance. She is not ill-appearing, toxic-appearing or diaphoretic.   HENT:      Right Ear: Tympanic membrane, ear canal and external ear normal.      Left Ear: Tympanic membrane, ear canal and external ear normal.      Nose: Nose normal. No rhinorrhea.      Mouth/Throat:      Mouth: Mucous membranes are moist.      Pharynx: Oropharynx is clear. No oropharyngeal exudate or posterior oropharyngeal erythema.   Cardiovascular:      Rate and Rhythm: Normal rate and regular rhythm.      Pulses: Normal pulses.      Heart sounds: Normal heart sounds. No murmur heard.     No friction rub. No gallop.   Pulmonary:      Effort: Pulmonary effort is normal. No respiratory distress.      Breath sounds: Normal breath sounds. No stridor. No wheezing.   Musculoskeletal:      Cervical back: Neck supple. No tenderness.      Right lower leg: No edema.      Left lower leg: No edema.   Lymphadenopathy:      Cervical: No cervical adenopathy.   Skin:     General: Skin is warm and dry.   Neurological:      General: No focal deficit present.      Mental Status: She is alert and oriented to person, place, and time. Mental " status is at baseline.   Psychiatric:         Mood and Affect: Mood normal.         Behavior: Behavior normal.         Procedures    Results:   Labs:   Hemoglobin A1C   Date Value Ref Range Status   01/10/2024 5.7 4.5 - 5.7 % Final     TSH   Date Value Ref Range Status   10/10/2023 3.210 0.450 - 4.500 uIU/mL Final        Imaging:   No valid procedures specified.        Patient declines flu vaccine.  Assessment / Plan      Assessment/Plan:   Diagnoses and all orders for this visit:    1. Prediabetes (Primary)  Assessment & Plan:  New diagnosis with hemoglobin A1c 5.9% on 10/10/2023.  Modest improvement diet and exercise with improved pattern to hemoglobin A1c of 5.7% today on 1/10/2024.  Continue healthy diet exercise.  Caution polyuria and polydipsia signs of worsening.  Recheck hemoglobin A1c when she follows up in 6 months.    Orders:  -     POC Glycosylated Hemoglobin (Hb A1C)  -     POC Glucose, Blood    2. Essential hypertension  Assessment & Plan:  With modest increase blood pressure 10/10/2023, good response with increase of lisinopril 20 mg daily to lisinopril 20/HCTZ 12.5 mg daily.  Blood pressure checked infrequently at home but when she does it is in the upper 110s to low 120s over 70s with no concerning symptoms.  Continue lisinopril/HCTZ 20/12.5 daily unchanged.  EKG non-concerning on 10/10/2023, with notable computer not recognizing the P waves but they are present, and unchanged compared to 6/30/2022, 12/14/2020, 4/8/2019.  No associated lightheadedness, dizziness, chest pain or palpitation.         3. Mixed hyperlipidemia  Assessment & Plan:  10/10/2023 total cholesterol 263, triglycerides 87, HDL 72 and , with resumption of atorvastatin 40 mg daily at that time due to worsening pattern.  Comparison 9/28/2019 total cholesterol 177, triglycerides 35, HDL 79, LDL 91 when she was on atorvastatin 40 mg daily.  Comparison 7/13/2019 with total cholesterol 321, triglycerides 49, HDL 78,  off  medicine.  Continue atorvastatin 40 mg daily.  Recheck CMP and lipid panel at this time to assess response to reinitiation of medication.   Recommendation ongoing healthy dietary intake, activity level, weight loss.  Advise concerns.    Orders:  -     Comprehensive Metabolic Panel; Future  -     Lipid Panel; Future  -     Lipid Panel  -     Comprehensive Metabolic Panel    4. Age-related osteoporosis without current pathological fracture  Assessment & Plan:  DEXA scan obtained at [facility] on 11/6/2023.  Results compared to November 2016.  Findings include the left hip with T score -2.4 which is a 14.5% decrease, the right hip with bone mineral density T score of -2.6 equivalent to 23.2% decrease.  As such she was osteopenic in the left hip and nearly osteoporotic in the right hip.  With increased risk of fracture with these findings, we initiate bisphosphonate with alendronate 70 mg weekly, but she has not been taking.  I reinforced benefits and she will plan to do so now.  Otherwise continue the vitamin D and calcium that she is taking daily.  Recheck DEXA scan in 2 to 3 years.          Follow Up:   Return in about 6 months (around 7/10/2024) for Medicare Wellness.        Gino Boggs MD  [facility] [place]

## 2024-01-10 NOTE — ASSESSMENT & PLAN NOTE
10/10/2023 total cholesterol 263, triglycerides 87, HDL 72 and , with resumption of atorvastatin 40 mg daily at that time due to worsening pattern.  Comparison 9/28/2019 total cholesterol 177, triglycerides 35, HDL 79, LDL 91 when she was on atorvastatin 40 mg daily.  Comparison 7/13/2019 with total cholesterol 321, triglycerides 49, HDL 78,  off medicine.  Continue atorvastatin 40 mg daily.  Recheck CMP and lipid panel at this time to assess response to reinitiation of medication.   Recommendation ongoing healthy dietary intake, activity level, weight loss.  Advise concerns.

## 2024-01-11 ENCOUNTER — TELEPHONE (OUTPATIENT)
Dept: FAMILY MEDICINE CLINIC | Facility: CLINIC | Age: 69
End: 2024-01-11
Payer: MEDICARE

## 2024-01-11 DIAGNOSIS — E87.1 HYPONATREMIA: Primary | ICD-10-CM

## 2024-01-11 DIAGNOSIS — I10 ESSENTIAL HYPERTENSION: ICD-10-CM

## 2024-01-11 LAB
ALBUMIN SERPL-MCNC: 4.6 G/DL (ref 3.9–4.9)
ALBUMIN/GLOB SERPL: 2.6 {RATIO} (ref 1.2–2.2)
ALP SERPL-CCNC: 73 IU/L (ref 44–121)
ALT SERPL-CCNC: 33 IU/L (ref 0–32)
AST SERPL-CCNC: 27 IU/L (ref 0–40)
BILIRUB SERPL-MCNC: 0.5 MG/DL (ref 0–1.2)
BUN SERPL-MCNC: 9 MG/DL (ref 8–27)
BUN/CREAT SERPL: 13 (ref 12–28)
CALCIUM SERPL-MCNC: 9.8 MG/DL (ref 8.7–10.3)
CHLORIDE SERPL-SCNC: 91 MMOL/L (ref 96–106)
CHOLEST SERPL-MCNC: 165 MG/DL (ref 100–199)
CO2 SERPL-SCNC: 21 MMOL/L (ref 20–29)
CREAT SERPL-MCNC: 0.72 MG/DL (ref 0.57–1)
EGFRCR SERPLBLD CKD-EPI 2021: 90 ML/MIN/1.73
GLOBULIN SER CALC-MCNC: 1.8 G/DL (ref 1.5–4.5)
GLUCOSE SERPL-MCNC: 97 MG/DL (ref 70–99)
HDLC SERPL-MCNC: 80 MG/DL
LDLC SERPL CALC-MCNC: 74 MG/DL (ref 0–99)
POTASSIUM SERPL-SCNC: 4.4 MMOL/L (ref 3.5–5.2)
PROT SERPL-MCNC: 6.4 G/DL (ref 6–8.5)
SODIUM SERPL-SCNC: 127 MMOL/L (ref 134–144)
TRIGL SERPL-MCNC: 51 MG/DL (ref 0–149)
VLDLC SERPL CALC-MCNC: 11 MG/DL (ref 5–40)

## 2024-01-11 RX ORDER — LISINOPRIL 40 MG/1
40 TABLET ORAL DAILY
Qty: 90 TABLET | Refills: 1 | Status: SHIPPED | OUTPATIENT
Start: 2024-01-11

## 2024-01-11 NOTE — TELEPHONE ENCOUNTER
Please speak the patient regarding laboratory investigations from 1/10/2024.  Notable results as follows:     CMP with notable good glucose 97, good kidney function creatinine 0.72 and GFR 90.  She has a lower sodium 127 and chloride at 91, compared to 3 months prior to 134 and 98 respectively with lower limit of normal sodium of 134 and chloride at 96.  Otherwise normal proteins, liver function test.  Of note a slight elevation of ALT at 33 with albumin of more 32 and from a few months ago 27 is nonconcerning, felt to be consistent with normal variant for patient.  Total cholesterol 165, triglycerides 51, HDL 80, LDL 74.  Excellent improvement after previous 3 months ago with total cholesterol 263, triglycerides 87, HDL 72, .      As such very happy with cholesterol response, continue atorvastatin 40 mg daily unchanged with additional healthy diet and exercise.  Related to low sodium, she appears to have significant sensitivity to the low-dose of HCTZ blood pressure medicine, such I like to stop lisinopril/HCTZ 20/12.5 and we will increase to lisinopril 40 mg alone without use of HCTZ.  As such I would like her to recheck a BMP in 2 weeks time, order provided to come by at that time to ensure sodium is improving.

## 2024-01-15 ENCOUNTER — TELEPHONE (OUTPATIENT)
Dept: FAMILY MEDICINE CLINIC | Facility: CLINIC | Age: 69
End: 2024-01-15
Payer: MEDICARE

## 2024-01-15 NOTE — TELEPHONE ENCOUNTER
Spoke to patient in regards to results.she is concerned about being off there hctz. Her rings are tight

## 2024-01-15 NOTE — TELEPHONE ENCOUNTER
I would not recommend her to continue being on HCTZ with a low-sodium.  HCTZ is a very modest diuretic, it should not have a large effect on puffiness in the body but it could be subtle.  Either way if she has a concern I would take her rings off until her body equilibrates.  Nonetheless, I recommend against adding a diuretic for that reason.  Advise concerns.

## 2024-01-15 NOTE — TELEPHONE ENCOUNTER
----- Message from Gino Boggs MD sent at 1/15/2024  8:01 AM EST -----  Please advise patient of negative Cologuard as obtained 1/5/2024.  Reassuring result with current recommendation repeat in 3 years.

## 2024-01-30 DIAGNOSIS — E78.2 MIXED HYPERLIPIDEMIA: ICD-10-CM

## 2024-01-30 RX ORDER — ATORVASTATIN CALCIUM 40 MG/1
40 TABLET, FILM COATED ORAL DAILY
Qty: 90 TABLET | Refills: 1 | Status: SHIPPED | OUTPATIENT
Start: 2024-01-30

## 2024-01-30 NOTE — TELEPHONE ENCOUNTER
Caller: CHATMANGLYNN    Relationship: Emergency Contact    Best call back number: [phone]     Requested Prescriptions:   Requested Prescriptions     Pending Prescriptions Disp Refills    atorvastatin (Lipitor) 40 MG tablet 90 tablet 1     Sig: Take 1 tablet by mouth Daily.        [facility] [address] [phone] [facility] [phone] FX     Last office visit with prescribing clinician: 1/10/2024   Last telemedicine visit with prescribing clinician: Visit date not found   Next office visit with prescribing clinician: 7/10/2024     Additional details provided by patient: PLEASE REFILL     Does the patient have less than a 3 day supply:  [] Yes  [x] No    Would you like a call back once the refill request has been completed: [] Yes [x] No    If the office needs to give you a call back, can they leave a voicemail: [] Yes [x] No    Raheem Shen Rep   01/30/24 16:10 EST

## 2024-02-13 DIAGNOSIS — E78.2 MIXED HYPERLIPIDEMIA: ICD-10-CM

## 2024-02-13 RX ORDER — ATORVASTATIN CALCIUM 40 MG/1
40 TABLET, FILM COATED ORAL DAILY
Qty: 90 TABLET | Refills: 1 | Status: SHIPPED | OUTPATIENT
Start: 2024-02-13 | End: 2024-02-15 | Stop reason: SDUPTHER

## 2024-02-15 ENCOUNTER — TELEPHONE (OUTPATIENT)
Dept: FAMILY MEDICINE CLINIC | Facility: CLINIC | Age: 69
End: 2024-02-15
Payer: MEDICARE

## 2024-02-15 DIAGNOSIS — E78.2 MIXED HYPERLIPIDEMIA: ICD-10-CM

## 2024-02-15 RX ORDER — ATORVASTATIN CALCIUM 40 MG/1
40 TABLET, FILM COATED ORAL DAILY
Qty: 30 TABLET | Refills: 0 | Status: SHIPPED | OUTPATIENT
Start: 2024-02-15 | End: 2024-02-16 | Stop reason: SDUPTHER

## 2024-02-16 ENCOUNTER — TELEPHONE (OUTPATIENT)
Dept: FAMILY MEDICINE CLINIC | Facility: CLINIC | Age: 69
End: 2024-02-16
Payer: MEDICARE

## 2024-02-16 DIAGNOSIS — E78.2 MIXED HYPERLIPIDEMIA: ICD-10-CM

## 2024-02-16 RX ORDER — ATORVASTATIN CALCIUM 40 MG/1
40 TABLET, FILM COATED ORAL DAILY
Qty: 30 TABLET | Refills: 0 | Status: SHIPPED | OUTPATIENT
Start: 2024-02-16

## 2024-04-09 DIAGNOSIS — I10 ESSENTIAL HYPERTENSION: ICD-10-CM

## 2024-04-09 RX ORDER — LISINOPRIL 40 MG/1
40 TABLET ORAL DAILY
Qty: 90 TABLET | Refills: 1 | Status: SHIPPED | OUTPATIENT
Start: 2024-04-09 | End: 2024-04-10 | Stop reason: SDUPTHER

## 2024-04-09 NOTE — TELEPHONE ENCOUNTER
Caller: CHATMANGLYNN    Relationship: Emergency Contact    Best call back number: [phone]     Requested Prescriptions:   Requested Prescriptions     Pending Prescriptions Disp Refills    lisinopril (PRINIVIL,ZESTRIL) 40 MG tablet 90 tablet 1     Sig: Take 1 tablet by mouth Daily.        Pharmacy where request should be sent: [id number] - [place] - [address] AT NEXT TO [facility] - [phone]  - [phone] FX     Last office visit with prescribing clinician: 1/10/2024   Last telemedicine visit with prescribing clinician: Visit date not found   Next office visit with prescribing clinician: 7/10/2024     Additional details provided by patient: PATIENT HAS 3 DAYS REMAINING. SHE IS NOW USING [facility] AND THEY NEED A NEW PRESCRIPTION FOR A 90 DAY SUPPLY. PLEASE ADVISE     Does the patient have less than a 3 day supply:  [x] Yes  [] No    Would you like a call back once the refill request has been completed: [x] Yes [] No    If the office needs to give you a call back, can they leave a voicemail: [x] Yes [] No    Raheem High Rep   04/09/24 12:21 EDT

## 2024-04-10 DIAGNOSIS — I10 ESSENTIAL HYPERTENSION: ICD-10-CM

## 2024-04-10 RX ORDER — LISINOPRIL 40 MG/1
40 TABLET ORAL DAILY
Qty: 90 TABLET | Refills: 1 | Status: SHIPPED | OUTPATIENT
Start: 2024-04-10

## 2024-04-10 NOTE — TELEPHONE ENCOUNTER
Caller: GLYNN CHATMAN    Relationship: Emergency Contact    Best call back number: [phone]     Requested Prescriptions:   Requested Prescriptions     Pending Prescriptions Disp Refills    lisinopril (PRINIVIL,ZESTRIL) 40 MG tablet 90 tablet 1     Sig: Take 1 tablet by mouth Daily.        Pharmacy where request should be sent: [id number] - [place], [place] - [address] AT [address]EXT TO [facility] - [phone]  - [phone] FX     Last office visit with prescribing clinician: 1/10/2024   Last telemedicine visit with prescribing clinician: Visit date not found   Next office visit with prescribing clinician: 7/10/2024     Additional details provided by patient: PHARMACY NOT RECEIVED    Does the patient have less than a 3 day supply:  [] Yes  [x] No    Would you like a call back once the refill request has been completed: [] Yes [x] No    If the office needs to give you a call back, can they leave a voicemail: [] Yes [x] No    Raheem Wallace   04/10/24 12:00 EDT

## 2024-06-01 DIAGNOSIS — I10 ESSENTIAL HYPERTENSION: ICD-10-CM

## 2024-06-03 RX ORDER — LISINOPRIL 40 MG/1
40 TABLET ORAL DAILY
Qty: 90 TABLET | Refills: 1 | Status: SHIPPED | OUTPATIENT
Start: 2024-06-03

## 2024-07-10 ENCOUNTER — OFFICE VISIT (OUTPATIENT)
Dept: FAMILY MEDICINE CLINIC | Facility: CLINIC | Age: 69
End: 2024-07-10
Payer: MEDICARE

## 2024-07-10 VITALS
WEIGHT: 138 LBS | SYSTOLIC BLOOD PRESSURE: 122 MMHG | TEMPERATURE: 97.5 F | HEIGHT: 61 IN | BODY MASS INDEX: 26.06 KG/M2 | DIASTOLIC BLOOD PRESSURE: 76 MMHG | HEART RATE: 70 BPM | OXYGEN SATURATION: 99 %

## 2024-07-10 DIAGNOSIS — Z00.01 ENCOUNTER FOR GENERAL ADULT MEDICAL EXAMINATION WITH ABNORMAL FINDINGS: Primary | ICD-10-CM

## 2024-07-10 DIAGNOSIS — M81.0 AGE-RELATED OSTEOPOROSIS WITHOUT CURRENT PATHOLOGICAL FRACTURE: ICD-10-CM

## 2024-07-10 DIAGNOSIS — R53.83 OTHER FATIGUE: ICD-10-CM

## 2024-07-10 DIAGNOSIS — E87.1 HYPONATREMIA: ICD-10-CM

## 2024-07-10 DIAGNOSIS — E55.9 VITAMIN D DEFICIENCY: ICD-10-CM

## 2024-07-10 DIAGNOSIS — E78.2 MIXED HYPERLIPIDEMIA: ICD-10-CM

## 2024-07-10 DIAGNOSIS — K21.9 GASTROESOPHAGEAL REFLUX DISEASE WITHOUT ESOPHAGITIS: ICD-10-CM

## 2024-07-10 DIAGNOSIS — J30.1 SEASONAL ALLERGIC RHINITIS DUE TO POLLEN: ICD-10-CM

## 2024-07-10 DIAGNOSIS — E66.3 OVERWEIGHT (BMI 25.0-29.9): ICD-10-CM

## 2024-07-10 DIAGNOSIS — R73.03 PREDIABETES: ICD-10-CM

## 2024-07-10 DIAGNOSIS — I10 ESSENTIAL HYPERTENSION: ICD-10-CM

## 2024-07-10 DIAGNOSIS — R35.1 NOCTURIA: ICD-10-CM

## 2024-07-10 LAB
EXPIRATION DATE: NORMAL
EXPIRATION DATE: NORMAL
GLUCOSE BLDC GLUCOMTR-MCNC: 117 MG/DL (ref 70–130)
HBA1C MFR BLD: 5.5 % (ref 4.5–5.7)
Lab: NORMAL
Lab: NORMAL

## 2024-07-10 PROCEDURE — 3044F HG A1C LEVEL LT 7.0%: CPT | Performed by: INTERNAL MEDICINE

## 2024-07-10 PROCEDURE — 1160F RVW MEDS BY RX/DR IN RCRD: CPT | Performed by: INTERNAL MEDICINE

## 2024-07-10 PROCEDURE — 1159F MED LIST DOCD IN RCRD: CPT | Performed by: INTERNAL MEDICINE

## 2024-07-10 PROCEDURE — 3078F DIAST BP <80 MM HG: CPT | Performed by: INTERNAL MEDICINE

## 2024-07-10 PROCEDURE — G0439 PPPS, SUBSEQ VISIT: HCPCS | Performed by: INTERNAL MEDICINE

## 2024-07-10 PROCEDURE — 99397 PER PM REEVAL EST PAT 65+ YR: CPT | Performed by: INTERNAL MEDICINE

## 2024-07-10 PROCEDURE — 83036 HEMOGLOBIN GLYCOSYLATED A1C: CPT | Performed by: INTERNAL MEDICINE

## 2024-07-10 PROCEDURE — 3074F SYST BP LT 130 MM HG: CPT | Performed by: INTERNAL MEDICINE

## 2024-07-10 PROCEDURE — 99214 OFFICE O/P EST MOD 30 MIN: CPT | Performed by: INTERNAL MEDICINE

## 2024-07-10 PROCEDURE — 1170F FXNL STATUS ASSESSED: CPT | Performed by: INTERNAL MEDICINE

## 2024-07-10 PROCEDURE — 93000 ELECTROCARDIOGRAM COMPLETE: CPT | Performed by: INTERNAL MEDICINE

## 2024-07-10 PROCEDURE — 82947 ASSAY GLUCOSE BLOOD QUANT: CPT | Performed by: INTERNAL MEDICINE

## 2024-07-10 RX ORDER — ALENDRONATE SODIUM 70 MG/1
70 TABLET ORAL
Qty: 12 TABLET | Refills: 3 | Status: SHIPPED | OUTPATIENT
Start: 2024-07-10

## 2024-07-10 NOTE — ASSESSMENT & PLAN NOTE
Blood work last 7/13/2019, tried multiple times in the interim, obtained finally today 10/10/2023 with managed per results.  No recent Pap smear, patient declines in clinic today, but may reconsider, and if so would pursue through gynecology, notable for previous gynecologist Dr. Cullen.  She is aware that at her age if she had a negative Pap smear this would represent her final.  Mammogram recommended, patient considering but not yet pursued, she plans to do so.  DEXA scan 11/20/2016 at [facility] with osteopenia not requiring treatment, patient agreeable to repeating today as of 10/10/2023.  TDaP vaccine given 6/30/2022, ABN completed.  Pneumococcal 13 valent vaccine given 4/8/2019, pneumococcal 23 valent vaccine given 12/14/2020.  Recommend 2 part shingles vaccine.  Flu vaccine declined.  Colonoscopy last 2007 by Dr. Jimenez, which was normal with 10 year followup.  Patient subsequently declined colonoscopy, but normal Cologuard 12/28/2020, and again 1/5/2024.  Repeat 3 years.

## 2024-07-10 NOTE — ASSESSMENT & PLAN NOTE
Current on vitamin D 1000 units daily OTC.  Vitamin D 25-hydroxy level in good range at 6020.0 on 10/10/2023 while taking vitamin D 1000 units daily.  With the recent and worsening to osteoporosis pattern with DEXA scan 11/6/2023, reinforced importance of pending vitamin D and calcium replacement.  Recheck vitamin D level with pending blood work 7/10/2024.

## 2024-07-10 NOTE — PROGRESS NOTES
Venipuncture Blood Specimen Collection  Venipuncture performed in right arm by Melissa Swanson MA with good hemostasis. Patient tolerated the procedure well without complications.   07/10/24   Melissa Swanson MA

## 2024-07-10 NOTE — ASSESSMENT & PLAN NOTE
DEXA scan obtained at [facility] on 11/6/2023.  Results compared to November 2016.  Findings include the left hip with T score -2.4 which is a 14.5% decrease, the right hip with bone mineral density T score of -2.6 equivalent to 23.2% decrease.  As such she was osteopenic in the left hip and nearly osteoporotic in the right hip.  With increased risk of fracture with these findings I have recommended initiation of alendronate 70 mg weekly, with patient having hesitation and not having started.  Nonetheless she is agreeable to that as of 7/10/2024 and prescription has been provided.  Otherwise continue the vitamin D and calcium that she is taking daily.  Recheck DEXA scan in 2 to 3 years.

## 2024-07-10 NOTE — PROGRESS NOTES
The ABCs of the Annual Wellness Visit  Subsequent Medicare Wellness Visit    Subjective    Sowmya Bacon is a 69 y.o. female who presents for a Subsequent Medicare Wellness Visit.    The following portions of the patient's history were reviewed and   updated as appropriate: allergies, current medications, past family history, past medical history, past social history, past surgical history, and problem list.    Compared to one year ago, the patient feels her physical   health is the same.    Compared to one year ago, the patient feels her mental   health is the same.    Recent Hospitalizations:  She was not admitted to the hospital during the last year.       Current Medical Providers:  Patient Care Team:  Gino Boggs MD as PCP - General (Internal Medicine)    Outpatient Medications Prior to Visit   Medication Sig Dispense Refill    atorvastatin (Lipitor) 40 MG tablet Take 1 tablet by mouth Daily. 30 tablet 0    cetirizine (zyrTEC) 10 MG tablet Take 1 tablet by mouth Daily.      Cholecalciferol 25 MCG (1000 UT) tablet Take 1 tablet by mouth Daily.      lisinopril (PRINIVIL,ZESTRIL) 40 MG tablet TAKE ONE TABLET BY MOUTH EVERY DAY 90 tablet 1    montelukast (Singulair) 10 MG tablet Take 1 tablet by mouth Every Night. 90 tablet 0    albuterol sulfate HFA (ProAir HFA) 108 (90 Base) MCG/ACT inhaler ProAir HFA 90 mcg/actuation aerosol inhaler   Inhale 2 puffs every 4-6 hours by inhalation route as needed.      alendronate (Fosamax) 70 MG tablet Take 1 tablet by mouth Every 7 (Seven) Days. 12 tablet 3     No facility-administered medications prior to visit.       No opioid medication identified on active medication list. I have reviewed chart for other potential  high risk medication/s and harmful drug interactions in the elderly.        Aspirin is not on active medication list.  Aspirin use is not indicated based on review of current medical condition/s. Risk of harm outweighs potential benefits.  .    Patient Active  "Problem List   Diagnosis    Encounter for general adult medical examination with abnormal findings    Essential hypertension    Mixed hyperlipidemia    Seasonal allergic rhinitis due to pollen    Chronic midline low back pain without sciatica    Gastroesophageal reflux disease without esophagitis    Vitamin D deficiency    Need for vaccination    Colon cancer screening    Overweight (BMI 25.0-29.9)    Prediabetes    Age-related osteoporosis without current pathological fracture    Hyponatremia     Advance Care Planning   Advance Care Planning     Advance Directive is not on file.  ACP discussion was held with the patient during this visit. Patient does not have an advance directive, information provided.     Objective    Vitals:    07/10/24 0835 07/10/24 0850   BP: 130/76 122/76   BP Location: Left arm    Patient Position: Sitting    Cuff Size: Adult    Pulse: 70    Temp: 97.5 °F (36.4 °C)    TempSrc: Temporal    SpO2: 99%    Weight: 62.6 kg (138 lb)    Height: 154.9 cm (61\")      Estimated body mass index is 26.07 kg/m² as calculated from the following:    Height as of this encounter: 154.9 cm (61\").    Weight as of this encounter: 62.6 kg (138 lb).           Does the patient have evidence of cognitive impairment? No    Lab Results   Component Value Date    HGBA1C 5.5 07/10/2024        HEALTH RISK ASSESSMENT    Smoking Status:  Social History     Tobacco Use   Smoking Status Never   Smokeless Tobacco Never     Alcohol Consumption:  Social History     Substance and Sexual Activity   Alcohol Use Not Currently    Comment: occ     Fall Risk Screen:    STEADI Fall Risk Assessment was completed, and patient is at LOW risk for falls.Assessment completed on:7/10/2024    Depression Screenin/10/2024     8:38 AM   PHQ-2/PHQ-9 Depression Screening   Little Interest or Pleasure in Doing Things 0-->not at all   Feeling Down, Depressed or Hopeless 0-->not at all   PHQ-9: Brief Depression Severity Measure Score 0 "       Health Habits and Functional and Cognitive Screenin/10/2024     8:39 AM   Functional & Cognitive Status   Do you have difficulty preparing food and eating? No   Do you have difficulty bathing yourself, getting dressed or grooming yourself? No   Do you have difficulty using the toilet? No   Do you have difficulty moving around from place to place? No   Do you have trouble with steps or getting out of a bed or a chair? No   Current Diet Well Balanced Diet   Dental Exam Up to date   Eye Exam Up to date   Exercise (times per week) 3 times per week   Current Exercises Include Walking;Yard Work   Do you need help using the phone?  No   Are you deaf or do you have serious difficulty hearing?  No   Do you need help to go to places out of walking distance? No   Do you need help shopping? No   Do you need help preparing meals?  No   Do you need help with housework?  No   Do you need help with laundry? No   Do you need help taking your medications? No   Do you need help managing money? No   Do you ever drive or ride in a car without wearing a seat belt? No   Have you felt unusual stress, anger or loneliness in the last month? No   Who do you live with? Spouse   If you need help, do you have trouble finding someone available to you? No   Have you been bothered in the last four weeks by sexual problems? No   Do you have difficulty concentrating, remembering or making decisions? No       Age-appropriate Screening Schedule:  Refer to the list below for future screening recommendations based on patient's age, sex and/or medical conditions. Orders for these recommended tests are listed in the plan section. The patient has been provided with a written plan.    Health Maintenance   Topic Date Due    MAMMOGRAM  Never done    ZOSTER VACCINE (1 of 2) Never done    COVID-19 Vaccine (1 - 2023-24 season) Never done    INFLUENZA VACCINE  2024    BMI FOLLOWUP  10/10/2024    LIPID PANEL  01/10/2025    ANNUAL WELLNESS VISIT   07/10/2025    DXA SCAN  11/06/2025    COLORECTAL CANCER SCREENING  01/05/2027    TDAP/TD VACCINES (3 - Td or Tdap) 06/30/2032    HEPATITIS C SCREENING  Completed    Pneumococcal Vaccine 65+  Completed                  CMS Preventative Services Quick Reference  Risk Factors Identified During Encounter  None Identified  The above risks/problems have been discussed with the patient.  Pertinent information has been shared with the patient in the After Visit Summary.  An After Visit Summary and PPPS were made available to the patient.    Follow Up:   Next Medicare Wellness visit to be scheduled in 1 year.       Additional E&M Note during same encounter follows:  Patient has multiple medical problems which are significant and separately identifiable that require additional work above and beyond the Medicare Wellness Visit.      Chief Complaint  Annual Exam    Subjective        HPI  Sowmya Bacon is also being seen today for wellness visit in addition to complete physical exam and follow-up regarding multimedical problems.  Blood pressure checked at home infrequently when checked in the 110s to low 120s over 70s.  No lightheadedness or dizziness.  Reflux symptoms do well with infrequent use of antacid no more than every few months.  Typically triggers with spicy or foods.  Ongoing replacement of vitamin D and calcium, but despite previous recommendations with osteoporosis pattern initiate alendronate she is still at hesitation initiate.  Discussed again in detail today and she is agreeable to resuming.  Regarding prediabetes, hyperlipidemia and overweight pattern, ongoing attempt to eat healthy and be active, hemoglobin A1c today improved.  No polyuria polydipsia.  She is actually lost 4 to 5 pounds since her last visit.  With lower sodium level, we have switched previous blood pressure medicine off of HCTZ but she never followed up to recheck a BMP, but she has felt otherwise at baseline.         Objective   Vital  "Signs:  /76   Pulse 70   Temp 97.5 °F (36.4 °C) (Temporal)   Ht 154.9 cm (61\")   Wt 62.6 kg (138 lb)   SpO2 99%   BMI 26.07 kg/m²     Physical Exam  Constitutional:       General: She is not in acute distress.     Appearance: Normal appearance. She is not ill-appearing, toxic-appearing or diaphoretic.   HENT:      Head: Normocephalic and atraumatic.      Right Ear: Tympanic membrane, ear canal and external ear normal.      Left Ear: Tympanic membrane, ear canal and external ear normal.      Nose: Nose normal. No rhinorrhea.      Mouth/Throat:      Mouth: Mucous membranes are moist.      Pharynx: Oropharynx is clear. No oropharyngeal exudate or posterior oropharyngeal erythema.   Eyes:      Extraocular Movements: Extraocular movements intact.      Conjunctiva/sclera: Conjunctivae normal.      Pupils: Pupils are equal, round, and reactive to light.   Neck:      Vascular: No carotid bruit.      Comments: No thyroid enlargement, no thyroid nodules  Cardiovascular:      Rate and Rhythm: Normal rate and regular rhythm.      Pulses: Normal pulses.      Heart sounds: Normal heart sounds. No murmur heard.     No friction rub. No gallop.   Pulmonary:      Effort: Pulmonary effort is normal. No respiratory distress.      Breath sounds: Normal breath sounds. No stridor. No wheezing.   Abdominal:      General: Abdomen is flat. Bowel sounds are normal. There is no distension.      Palpations: Abdomen is soft. There is no mass.      Tenderness: There is no abdominal tenderness. There is no guarding or rebound.      Hernia: No hernia is present.   Genitourinary:     Comments: Patient defers  exam, plans through gynecology  Musculoskeletal:      Cervical back: Neck supple. No tenderness.      Right lower leg: No edema.      Left lower leg: No edema.   Lymphadenopathy:      Cervical: No cervical adenopathy.   Skin:     General: Skin is warm and dry.      Capillary Refill: Capillary refill takes less than 2 seconds. "   Neurological:      General: No focal deficit present.      Mental Status: She is alert and oriented to person, place, and time. Mental status is at baseline.   Psychiatric:         Mood and Affect: Mood normal.         Behavior: Behavior normal.         Thought Content: Thought content normal.                  ECG 12 Lead    Date/Time: 7/10/2024 10:14 AM  Performed by: Gino Boggs MD    Authorized by: Gino Boggs MD  Comparison: compared with previous ECG from 10/10/2023  Similar to previous ECG  Rhythm: sinus rhythm  Rate: normal  Conduction: conduction normal  ST Segments: ST segments normal  T Waves: T waves normal  QRS axis: normal  Other: no other findings    Clinical impression: normal ECG  Comments: EKG notable for some borderline low voltage in precordial leads, overall nonconcerning and stable pattern.  Computer over read of septal myocardial infarction not correct as there are V waves in V1 and V2.  Overall nonconcerning and normal EKG without change from prior.              Assessment and Plan   Diagnoses and all orders for this visit:    1. Encounter for general adult medical examination with abnormal findings (Primary)  Assessment & Plan:  Blood work last 7/13/2019, tried multiple times in the interim, obtained finally today 10/10/2023 with managed per results.  No recent Pap smear, patient declines in clinic today, but may reconsider, and if so would pursue through gynecology, notable for previous gynecologist Dr. Cullen.  She is aware that at her age if she had a negative Pap smear this would represent her final.  Mammogram recommended, patient considering but not yet pursued, she plans to do so.  DEXA scan 11/20/2016 at [facility] with osteopenia not requiring treatment, patient agreeable to repeating today as of 10/10/2023.  TDaP vaccine given 6/30/2022, ABN completed.  Pneumococcal 13 valent vaccine given 4/8/2019, pneumococcal 23 valent vaccine given 12/14/2020.  Recommend 2 part  shingles vaccine.  Flu vaccine declined.  Colonoscopy last 2007 by Dr. Jimenez, which was normal with 10 year followup.  Patient subsequently declined colonoscopy, but normal Cologuard 12/28/2020, and again 1/5/2024.  Repeat 3 years.      2. Prediabetes  Assessment & Plan:  Diagnosis with hemoglobin A1c 5.9% on 10/10/2023.  With improved diet, Hemoglobin A1c continues improved today on 7/10/2024 at 5.5%, previous 5.7% today on 1/10/2024. Continue healthy diet exercise. Caution polyuria and polydipsia signs of worsening. Recheck hemoglobin A1c every 6 months.    Orders:  -     POC Glycosylated Hemoglobin (Hb A1C)  -     POC Glucose, Blood    3. Age-related osteoporosis without current pathological fracture  Assessment & Plan:  DEXA scan obtained at [facility] on 11/6/2023.  Results compared to November 2016.  Findings include the left hip with T score -2.4 which is a 14.5% decrease, the right hip with bone mineral density T score of -2.6 equivalent to 23.2% decrease.  As such she was osteopenic in the left hip and nearly osteoporotic in the right hip.  With increased risk of fracture with these findings I have recommended initiation of alendronate 70 mg weekly, with patient having hesitation and not having started.  Nonetheless she is agreeable to that as of 7/10/2024 and prescription has been provided.  Otherwise continue the vitamin D and calcium that she is taking daily.  Recheck DEXA scan in 2 to 3 years.     Orders:  -     alendronate (Fosamax) 70 MG tablet; Take 1 tablet by mouth Every 7 (Seven) Days.  Dispense: 12 tablet; Refill: 3    4. Essential hypertension  Assessment & Plan:  With modest increase blood pressure 10/10/2023, good response with increase of lisinopril 20 mg daily to lisinopril 20/HCTZ 12.5 mg daily, but due to lower sodium on 1/10/2004, lisinopril/HCTZ 20/12.5 switched over to lisinopril 40 mg daily with good control.  Continue medication unchanged.  EKG non-concerning on  7/10/2024, unchanged compared to 10/10/2023, 6/30/2022, 12/14/2020, 4/8/2019.  No associated lightheadedness, dizziness, chest pain or palpitation.     Orders:  -     ECG 12 Lead    5. Mixed hyperlipidemia  Assessment & Plan:  After initiation of atorvastatin 40 mg daily, 1/10/2024 blood work with total cholesterol 165, triglycerides 51, HDL 80, LDL 74.  Much improved with medicine.  Previous 10/10/2023 total cholesterol 263, triglycerides 87, HDL 72 and , 9/28/2019 total cholesterol 177, triglycerides 35, HDL 79, LDL 91 when she was on atorvastatin 40 mg daily.  Comparison 7/13/2019 with total cholesterol 321, triglycerides 49, HDL 78,  off medicine.  Continue atorvastatin 40 mg daily.  Recommendation ongoing healthy dietary intake, activity level, weight loss.  Monitor blood work at minimum yearly.  Advise concerns.     Orders:  -     Comprehensive Metabolic Panel; Future  -     Lipid Panel; Future  -     Lipid Panel  -     Comprehensive Metabolic Panel    6. Overweight (BMI 25.0-29.9)  Assessment & Plan:  Modestly overweight, still quite a healthy weight as per body frame. Nonetheless reinforced importance of healthy diet, exercise and even modest weight loss.       7. Gastroesophageal reflux disease without esophagitis  Assessment & Plan:  Infrequent flare without any sticking sensation in the throat. uses over-the-counter medication with benefit. Reflux precautions reinforced. Advise concerns.       8. Vitamin D deficiency  Assessment & Plan:  Current on vitamin D 1000 units daily OTC.  Vitamin D 25-hydroxy level in good range at 6020.0 on 10/10/2023 while taking vitamin D 1000 units daily.  With the recent and worsening to osteoporosis pattern with DEXA scan 11/6/2023, reinforced importance of pending vitamin D and calcium replacement.  Recheck vitamin D level with pending blood work 7/10/2024.    Orders:  -     Vitamin D,25-Hydroxy; Future  -     Vitamin D,25-Hydroxy    9. Seasonal allergic  rhinitis due to pollen  Assessment & Plan:  Seasonal pattern with modest benefit over-the-counter antihistamine, typically Zyrtec. Some breakthrough symptoms, I have added Singulair 10 mg tablet daily to regimen and she could also use Flonase although sometimes it is irritating. He also all 3 together for the next couple weeks, then as needed. Additional benefit of saline spray, nasal flushing. Advise concerns.       10. Other fatigue  -     CBC & Differential; Future  -     TSH Rfx On Abnormal To Free T4; Future  -     TSH Rfx On Abnormal To Free T4  -     CBC & Differential    11. Nocturia  -     Urinalysis With Culture If Indicated - Urine, Clean Catch; Future  -     Urinalysis With Culture If Indicated - Urine, Clean Catch    12. Hyponatremia  Assessment & Plan:  Sodium 127 with blood work 1/10/2024 felt related to HCTZ which was discontinued off of the lisinopril/HCTZ 20/12.5, increase to lisinopril 40 mg daily.  Plan is to recheck BMP in 2 weeks but that she never had completed.  As such we will recheck with blood work today 7/10/2024, management per results.    Orders:  -     Comprehensive Metabolic Panel; Future  -     Comprehensive Metabolic Panel             Follow Up   Return in about 6 months (around 1/10/2025) for Next scheduled follow up.  Patient was given instructions and counseling regarding her condition or for health maintenance advice. Please see specific information pulled into the AVS if appropriate.

## 2024-07-10 NOTE — ASSESSMENT & PLAN NOTE
Diagnosis with hemoglobin A1c 5.9% on 10/10/2023.  With improved diet, Hemoglobin A1c continues improved today on 7/10/2024 at 5.5%, previous 5.7% today on 1/10/2024. Continue healthy diet exercise. Caution polyuria and polydipsia signs of worsening. Recheck hemoglobin A1c every 6 months.

## 2024-07-10 NOTE — ASSESSMENT & PLAN NOTE
Sodium 127 with blood work 1/10/2024 felt related to HCTZ which was discontinued off of the lisinopril/HCTZ 20/12.5, increase to lisinopril 40 mg daily.  Plan is to recheck BMP in 2 weeks but that she never had completed.  As such we will recheck with blood work today 7/10/2024, management per results.

## 2024-07-10 NOTE — ASSESSMENT & PLAN NOTE
With modest increase blood pressure 10/10/2023, good response with increase of lisinopril 20 mg daily to lisinopril 20/HCTZ 12.5 mg daily, but due to lower sodium on 1/10/2004, lisinopril/HCTZ 20/12.5 switched over to lisinopril 40 mg daily with good control.  Continue medication unchanged.  EKG non-concerning on 7/10/2024, unchanged compared to 10/10/2023, 6/30/2022, 12/14/2020, 4/8/2019.  No associated lightheadedness, dizziness, chest pain or palpitation.

## 2024-07-10 NOTE — ASSESSMENT & PLAN NOTE
After initiation of atorvastatin 40 mg daily, 1/10/2024 blood work with total cholesterol 165, triglycerides 51, HDL 80, LDL 74.  Much improved with medicine.  Previous 10/10/2023 total cholesterol 263, triglycerides 87, HDL 72 and , 9/28/2019 total cholesterol 177, triglycerides 35, HDL 79, LDL 91 when she was on atorvastatin 40 mg daily.  Comparison 7/13/2019 with total cholesterol 321, triglycerides 49, HDL 78,  off medicine.  Continue atorvastatin 40 mg daily.  Recommendation ongoing healthy dietary intake, activity level, weight loss.  Monitor blood work at minimum yearly.  Advise concerns.

## 2024-07-11 LAB
25(OH)D3+25(OH)D2 SERPL-MCNC: 66.5 NG/ML (ref 30–100)
ALBUMIN SERPL-MCNC: 4.5 G/DL (ref 3.8–4.8)
ALP SERPL-CCNC: 66 IU/L (ref 44–121)
ALT SERPL-CCNC: 25 IU/L (ref 0–32)
AST SERPL-CCNC: 25 IU/L (ref 0–40)
BASOPHILS # BLD AUTO: 0 X10E3/UL (ref 0–0.2)
BASOPHILS NFR BLD AUTO: 1 %
BILIRUB SERPL-MCNC: 0.4 MG/DL (ref 0–1.2)
BUN SERPL-MCNC: 10 MG/DL (ref 8–27)
BUN/CREAT SERPL: 14 (ref 12–28)
CALCIUM SERPL-MCNC: 9.6 MG/DL (ref 8.7–10.3)
CHLORIDE SERPL-SCNC: 100 MMOL/L (ref 96–106)
CHOLEST SERPL-MCNC: 188 MG/DL (ref 100–199)
CO2 SERPL-SCNC: 21 MMOL/L (ref 20–29)
CREAT SERPL-MCNC: 0.73 MG/DL (ref 0.57–1)
EGFRCR SERPLBLD CKD-EPI 2021: 88 ML/MIN/1.73
EOSINOPHIL # BLD AUTO: 0.1 X10E3/UL (ref 0–0.4)
EOSINOPHIL NFR BLD AUTO: 1 %
ERYTHROCYTE [DISTWIDTH] IN BLOOD BY AUTOMATED COUNT: 12.4 % (ref 11.7–15.4)
GLOBULIN SER CALC-MCNC: 2.3 G/DL (ref 1.5–4.5)
GLUCOSE SERPL-MCNC: 97 MG/DL (ref 70–99)
HCT VFR BLD AUTO: 38.8 % (ref 34–46.6)
HDLC SERPL-MCNC: 70 MG/DL
HGB BLD-MCNC: 12.6 G/DL (ref 11.1–15.9)
IMM GRANULOCYTES # BLD AUTO: 0 X10E3/UL (ref 0–0.1)
IMM GRANULOCYTES NFR BLD AUTO: 0 %
LDLC SERPL CALC-MCNC: 104 MG/DL (ref 0–99)
LYMPHOCYTES # BLD AUTO: 2.2 X10E3/UL (ref 0.7–3.1)
LYMPHOCYTES NFR BLD AUTO: 40 %
MCH RBC QN AUTO: 29.7 PG (ref 26.6–33)
MCHC RBC AUTO-ENTMCNC: 32.5 G/DL (ref 31.5–35.7)
MCV RBC AUTO: 92 FL (ref 79–97)
MONOCYTES # BLD AUTO: 0.4 X10E3/UL (ref 0.1–0.9)
MONOCYTES NFR BLD AUTO: 8 %
NEUTROPHILS # BLD AUTO: 2.8 X10E3/UL (ref 1.4–7)
NEUTROPHILS NFR BLD AUTO: 50 %
PLATELET # BLD AUTO: 398 X10E3/UL (ref 150–450)
POTASSIUM SERPL-SCNC: 4.5 MMOL/L (ref 3.5–5.2)
PROT SERPL-MCNC: 6.8 G/DL (ref 6–8.5)
RBC # BLD AUTO: 4.24 X10E6/UL (ref 3.77–5.28)
SODIUM SERPL-SCNC: 135 MMOL/L (ref 134–144)
TRIGL SERPL-MCNC: 79 MG/DL (ref 0–149)
TSH SERPL DL<=0.005 MIU/L-ACNC: 2.72 UIU/ML (ref 0.45–4.5)
VLDLC SERPL CALC-MCNC: 14 MG/DL (ref 5–40)
WBC # BLD AUTO: 5.6 X10E3/UL (ref 3.4–10.8)

## 2024-07-23 ENCOUNTER — TELEPHONE (OUTPATIENT)
Dept: FAMILY MEDICINE CLINIC | Facility: CLINIC | Age: 69
End: 2024-07-23
Payer: MEDICARE

## 2024-07-23 NOTE — TELEPHONE ENCOUNTER
----- Message from Gino Boggs sent at 7/23/2024  7:57 AM EDT -----  Can we please make the patient regarding laboratory investigations as obtained 7/10/2024.  Notable results as follows:    Total cholesterol 188, triglycerides 79, HDL 70, .  Comparison 6 months prior with total cholesterol 165, triglycerides 51, HDL 80, LDL 74.  Still in good range, continue atorvastatin 40 mg daily, healthy diet and exercise.  Vitamin D 25-hydroxy level continues in good range at 66.5 compared to 62.09 months prior.  TSH normal at 2.720.  CMP with normal glucose 97, good kidney function creatinine 0.73, GFR 88.  Normal electrolytes, proteins and liver function test.  CBC with differential with normal blood counts.    Overall good blood work results with no management changes necessary.

## 2024-08-11 DIAGNOSIS — E78.2 MIXED HYPERLIPIDEMIA: ICD-10-CM

## 2024-08-12 RX ORDER — ATORVASTATIN CALCIUM 40 MG/1
40 TABLET, FILM COATED ORAL DAILY
Qty: 90 TABLET | Refills: 1 | Status: SHIPPED | OUTPATIENT
Start: 2024-08-12

## 2024-11-05 DIAGNOSIS — I10 ESSENTIAL HYPERTENSION: ICD-10-CM

## 2024-11-05 RX ORDER — LISINOPRIL 40 MG/1
40 TABLET ORAL DAILY
Qty: 90 TABLET | Refills: 1 | Status: SHIPPED | OUTPATIENT
Start: 2024-11-05

## 2024-11-05 NOTE — TELEPHONE ENCOUNTER
Caller: Jordi Oswmya    Relationship: Self    Best call back number: [phone]     Requested Prescriptions:   Requested Prescriptions     Pending Prescriptions Disp Refills    lisinopril (PRINIVIL,ZESTRIL) 40 MG tablet 90 tablet 1     Sig: Take 1 tablet by mouth Daily.        Pharmacy where request should be sent: [facility]Y #[id number] - [place] [address] [address] AT NEXT TO [facility] - [phone]  - [phone] FX     Last office visit with prescribing clinician: 7/10/2024   Last telemedicine visit with prescribing clinician: Visit date not found   Next office visit with prescribing clinician: 1/10/2025     Additional details provided by patient: 90 DAY SUPPLY PLEASE.  ONLY HAS ONE PILL LEFT.    Does the patient have less than a 3 day supply:  [x] Yes  [] No    Would you like a call back once the refill request has been completed: [] Yes [x] No    If the office needs to give you a call back, can they leave a voicemail: [] Yes [x] No    Raheem Briceño Rep   11/05/24 08:33 EST

## 2024-12-18 ENCOUNTER — TELEPHONE (OUTPATIENT)
Dept: FAMILY MEDICINE CLINIC | Facility: CLINIC | Age: 69
End: 2024-12-18
Payer: MEDICARE

## 2024-12-18 NOTE — TELEPHONE ENCOUNTER
Called pt to reschedule appt on 1/10 as DR. Christian will be out of the office. Hub may read and schedule.

## 2025-02-03 ENCOUNTER — OFFICE VISIT (OUTPATIENT)
Dept: FAMILY MEDICINE CLINIC | Facility: CLINIC | Age: 70
End: 2025-02-03
Payer: MEDICARE

## 2025-02-03 VITALS
BODY MASS INDEX: 27.19 KG/M2 | DIASTOLIC BLOOD PRESSURE: 80 MMHG | SYSTOLIC BLOOD PRESSURE: 112 MMHG | WEIGHT: 144 LBS | HEIGHT: 61 IN | TEMPERATURE: 98 F | HEART RATE: 76 BPM | OXYGEN SATURATION: 99 %

## 2025-02-03 DIAGNOSIS — E55.9 VITAMIN D DEFICIENCY: ICD-10-CM

## 2025-02-03 DIAGNOSIS — E87.1 HYPONATREMIA: ICD-10-CM

## 2025-02-03 DIAGNOSIS — M54.50 CHRONIC MIDLINE LOW BACK PAIN WITHOUT SCIATICA: ICD-10-CM

## 2025-02-03 DIAGNOSIS — E78.2 MIXED HYPERLIPIDEMIA: ICD-10-CM

## 2025-02-03 DIAGNOSIS — E66.3 OVERWEIGHT (BMI 25.0-29.9): ICD-10-CM

## 2025-02-03 DIAGNOSIS — I10 ESSENTIAL HYPERTENSION: ICD-10-CM

## 2025-02-03 DIAGNOSIS — G89.29 CHRONIC MIDLINE LOW BACK PAIN WITHOUT SCIATICA: ICD-10-CM

## 2025-02-03 DIAGNOSIS — R73.03 PREDIABETES: Primary | ICD-10-CM

## 2025-02-03 DIAGNOSIS — K21.9 GASTROESOPHAGEAL REFLUX DISEASE WITHOUT ESOPHAGITIS: ICD-10-CM

## 2025-02-03 DIAGNOSIS — M81.0 AGE-RELATED OSTEOPOROSIS WITHOUT CURRENT PATHOLOGICAL FRACTURE: ICD-10-CM

## 2025-02-03 LAB
EXPIRATION DATE: NORMAL
EXPIRATION DATE: NORMAL
GLUCOSE BLDC GLUCOMTR-MCNC: 108 MG/DL (ref 70–130)
HBA1C MFR BLD: 5.7 % (ref 4.5–5.7)
Lab: NORMAL
Lab: NORMAL

## 2025-02-03 PROCEDURE — 82947 ASSAY GLUCOSE BLOOD QUANT: CPT | Performed by: INTERNAL MEDICINE

## 2025-02-03 PROCEDURE — 3044F HG A1C LEVEL LT 7.0%: CPT | Performed by: INTERNAL MEDICINE

## 2025-02-03 PROCEDURE — 1160F RVW MEDS BY RX/DR IN RCRD: CPT | Performed by: INTERNAL MEDICINE

## 2025-02-03 PROCEDURE — 3074F SYST BP LT 130 MM HG: CPT | Performed by: INTERNAL MEDICINE

## 2025-02-03 PROCEDURE — 83036 HEMOGLOBIN GLYCOSYLATED A1C: CPT | Performed by: INTERNAL MEDICINE

## 2025-02-03 PROCEDURE — 99214 OFFICE O/P EST MOD 30 MIN: CPT | Performed by: INTERNAL MEDICINE

## 2025-02-03 PROCEDURE — 1159F MED LIST DOCD IN RCRD: CPT | Performed by: INTERNAL MEDICINE

## 2025-02-03 PROCEDURE — G2211 COMPLEX E/M VISIT ADD ON: HCPCS | Performed by: INTERNAL MEDICINE

## 2025-02-03 PROCEDURE — 3079F DIAST BP 80-89 MM HG: CPT | Performed by: INTERNAL MEDICINE

## 2025-02-03 RX ORDER — ALENDRONATE SODIUM 70 MG/1
70 TABLET ORAL
Qty: 12 TABLET | Refills: 3 | Status: SHIPPED | OUTPATIENT
Start: 2025-02-03

## 2025-02-03 NOTE — ASSESSMENT & PLAN NOTE
Sodium 127 with blood work 1/10/2024 felt related to HCTZ which was discontinued off of the lisinopril/HCTZ 20/12.5, increase to lisinopril 40 mg daily.  Recheck with blood work 7/10/2024 shows normalized sodium to 135.  No other changes necessary.

## 2025-02-03 NOTE — ASSESSMENT & PLAN NOTE
DEXA scan obtained at [facility] on 11/6/2023.  Results compared to November 2016.  Findings include the left hip with T score -2.4 which is a 14.5% decrease, the right hip with bone mineral density T score of -2.6 equivalent to 23.2% decrease.  As such she was osteopenic in the left hip and nearly osteoporotic in the right hip.  With increased risk of fracture with these findings I have recommended initiation of alendronate 70 mg weekly, with patient having hesitation and not having started.  As of 2/3/2025 we discussed again benefit of alendronate and she has some hesitation with GERD but she is agreeable to initiating but if she had GERD symptoms she would potentially stop it.  Refill provided.  Otherwise continue the vitamin D and calcium that she is taking daily.  Recheck DEXA scan in 2 to 3 years.

## 2025-02-03 NOTE — ASSESSMENT & PLAN NOTE
Current on vitamin D 1000 units daily OTC.  Vitamin D 25-hydroxy level in good range at 66.5 on 7/10/2025, 62.0 on 10/10/2023 while taking vitamin D 1000 units daily.  Continue vitamin D 1000 units daily.

## 2025-02-03 NOTE — ASSESSMENT & PLAN NOTE
Long-standing pattern.  Historically responsive to preventative measures with once monthly chiropractor, heating pack, anti-inflammatories was not satisfactory as of late 2021 and in 2022, with progression of symptoms.  [facility] with abnormalities and ultimately perform surgical L4/L5 fusion on 8/9/2022.  He has done well since, no new concerns as of 2/3/2025.

## 2025-02-03 NOTE — ASSESSMENT & PLAN NOTE
7/10/2024 total cholesterol 188, triglycerides 79, HDL 70, LDL 94.  This is continuing on atorvastatin 40 mg daily.  After initiation of atorvastatin 40 mg daily, 1/10/2024 blood work with total cholesterol 165, triglycerides 51, HDL 80, LDL 74.  Much improved with medicine.  Previous 10/10/2023 total cholesterol 263, triglycerides 87, HDL 72 and , 9/28/2019 total cholesterol 177, triglycerides 35, HDL 79, LDL 91 when she was on atorvastatin 40 mg daily.  Comparison 7/13/2019 with total cholesterol 321, triglycerides 49, HDL 78,  off medicine.  Continue atorvastatin 40 mg daily.  Recommendation ongoing healthy dietary intake, activity level, weight loss.  Monitor blood work at minimum yearly.  Advise concerns.

## 2025-02-03 NOTE — PROGRESS NOTES
Follow Up Office Visit      Date: 2025   Patient Name: Sowmya Bacon  : 1955   MRN: [id number]     Chief Complaint:    Chief Complaint   Patient presents with    Med Refill       History of Present Illness: Sowmya Bacon is a 70 y.o. female who is here today to follow up with multimedical problems.  Regarding prediabetic pattern, slight slack in diet through the winter months where she is gained 6 pounds but is planning to try to start doing better with better diet and activity.  No polyuria polydipsia.  Regarding her osteoporosis, she still in hesitation initiating alendronate or from the side effect potential GERD, discussing potential benefits today in detail, she is agreeable to trying again and will monitor for GERD.  Blood pressure infrequently checked at home which she does not as good range.  Reflux symptoms and not flank this time.  Allergies doing well.  Regarding sodium, check with 2024 blood work was good, she continues her blood pressure medicine unchanged..    Subjective      Review of Systems:   Review of Systems    I have reviewed the patients family history, social history, past medical history, past surgical history and have updated it as appropriate.     Medications:     Current Outpatient Medications:     alendronate (Fosamax) 70 MG tablet, Take 1 tablet by mouth Every 7 (Seven) Days., Disp: 12 tablet, Rfl: 3    atorvastatin (LIPITOR) 40 MG tablet, TAKE ONE TABLET BY MOUTH EVERY DAY, Disp: 90 tablet, Rfl: 1    cetirizine (zyrTEC) 10 MG tablet, Take 1 tablet by mouth Daily., Disp: , Rfl:     Cholecalciferol 25 MCG (1000 UT) tablet, Take 1 tablet by mouth Daily., Disp: , Rfl:     lisinopril (PRINIVIL,ZESTRIL) 40 MG tablet, Take 1 tablet by mouth Daily., Disp: 90 tablet, Rfl: 1    montelukast (Singulair) 10 MG tablet, Take 1 tablet by mouth Every Night., Disp: 90 tablet, Rfl: 0    Allergies:   No Known Allergies    Objective     Physical Exam: Please see above  Vital Signs:  "  Vitals:    02/03/25 0829   BP: 112/80   BP Location: Left arm   Patient Position: Sitting   Cuff Size: Adult   Pulse: 76   Temp: 98 °F (36.7 °C)   TempSrc: Temporal   SpO2: 99%   Weight: 65.3 kg (144 lb)   Height: 154.9 cm (61\")     Facility age limit for growth %dwight is 20 years.  Body mass index is 27.21 kg/m².    Physical Exam  Constitutional:       General: She is not in acute distress.     Appearance: Normal appearance. She is not ill-appearing, toxic-appearing or diaphoretic.   HENT:      Right Ear: Tympanic membrane, ear canal and external ear normal.      Left Ear: Tympanic membrane, ear canal and external ear normal.      Nose: Nose normal. No rhinorrhea.      Mouth/Throat:      Mouth: Mucous membranes are moist.      Pharynx: Oropharynx is clear. No oropharyngeal exudate or posterior oropharyngeal erythema.   Neck:      Vascular: No carotid bruit.   Cardiovascular:      Rate and Rhythm: Normal rate and regular rhythm.      Pulses: Normal pulses.      Heart sounds: Normal heart sounds. No murmur heard.     No friction rub. No gallop.   Pulmonary:      Effort: Pulmonary effort is normal. No respiratory distress.      Breath sounds: Normal breath sounds. No stridor. No wheezing.   Abdominal:      General: Abdomen is flat. Bowel sounds are normal. There is no distension.      Palpations: Abdomen is soft. There is no mass.      Tenderness: There is no abdominal tenderness. There is no guarding or rebound.   Musculoskeletal:      Cervical back: Neck supple. No tenderness.      Right lower leg: No edema.      Left lower leg: No edema.   Lymphadenopathy:      Cervical: No cervical adenopathy.   Skin:     General: Skin is warm and dry.      Capillary Refill: Capillary refill takes less than 2 seconds.   Neurological:      General: No focal deficit present.      Mental Status: She is alert and oriented to person, place, and time. Mental status is at baseline.   Psychiatric:         Mood and Affect: Mood normal.    "      Behavior: Behavior normal.         Thought Content: Thought content normal.         Procedures    Results:   Labs:   Hemoglobin A1C   Date Value Ref Range Status   02/03/2025 5.7 4.5 - 5.7 % Final     TSH   Date Value Ref Range Status   07/10/2024 2.720 0.450 - 4.500 uIU/mL Final        Imaging:   No valid procedures specified.     BMI is >= 25 and <30. (Overweight) The following options were offered after discussion;: weight loss educational material (shared in after visit summary), exercise counseling/recommendations, and nutrition counseling/recommendations      Vaccine Counseling:      Assessment / Plan      Assessment/Plan:   Diagnoses and all orders for this visit:    1. Prediabetes (Primary)  Assessment & Plan:  Diagnosis with hemoglobin A1c 5.9% on 10/10/2023.  With some slack in diet and 6 pound weight gain as of 2/3/2025, hemoglobin A1c increased slightly to 5.7%, but still overall good range.  Previous 7/10/2024 at 5.5%, 5.7% on 1/10/2024, and 5.9% on diagnosis 10/10/2023. Continue healthy diet exercise. Caution polyuria and polydipsia signs of worsening. Recheck hemoglobin A1c every 6 months.     Orders:  -     POC Glycosylated Hemoglobin (Hb A1C)  -     POC Glucose, Blood    2. Age-related osteoporosis without current pathological fracture  Assessment & Plan:  DEXA scan obtained at [facility] on 11/6/2023.  Results compared to November 2016.  Findings include the left hip with T score -2.4 which is a 14.5% decrease, the right hip with bone mineral density T score of -2.6 equivalent to 23.2% decrease.  As such she was osteopenic in the left hip and nearly osteoporotic in the right hip.  With increased risk of fracture with these findings I have recommended initiation of alendronate 70 mg weekly, with patient having hesitation and not having started.  As of 2/3/2025 we discussed again benefit of alendronate and she has some hesitation with GERD but she is agreeable to initiating  but if she had GERD symptoms she would potentially stop it.  Refill provided.  Otherwise continue the vitamin D and calcium that she is taking daily.  Recheck DEXA scan in 2 to 3 years.     Orders:  -     alendronate (Fosamax) 70 MG tablet; Take 1 tablet by mouth Every 7 (Seven) Days.  Dispense: 12 tablet; Refill: 3    3. Essential hypertension  Assessment & Plan:  With modest increase blood pressure 10/10/2023, good response with increase of lisinopril 20 mg daily to lisinopril 20/HCTZ 12.5 mg daily, but due to lower sodium on 1/10/2004, lisinopril/HCTZ 20/12.5 switched over to lisinopril 40 mg daily with good control.  Continue medication unchanged.  EKG non-concerning on 7/10/2024, unchanged compared to 10/10/2023, 6/30/2022, 12/14/2020, 4/8/2019.  No associated lightheadedness, dizziness, chest pain or palpitation.  No new concerns as of 2/3/2025.      4. Gastroesophageal reflux disease without esophagitis  Assessment & Plan:  Infrequent flare without any sticking sensation in the throat. uses over-the-counter medication with benefit. Reflux precautions reinforced.  She does have some caution that she may flare her symptoms with initiation of alendronate as of 2/3/2025 visit , we will monitor closely.      5. Chronic midline low back pain without sciatica  Assessment & Plan:  Long-standing pattern.  Historically responsive to preventative measures with once monthly chiropractor, heating pack, anti-inflammatories was not satisfactory as of late 2021 and in 2022, with progression of symptoms.  Ultimately evaluation by Dr. Edmundo Mckeon at [facility] who performed MRI with abnormalities and ultimately perform surgical L4/L5 fusion on 8/9/2022.  He has done well since, no new concerns as of 2/3/2025.      6. Hyponatremia  Assessment & Plan:  Sodium 127 with blood work 1/10/2024 felt related to HCTZ which was discontinued off of the lisinopril/HCTZ 20/12.5, increase to lisinopril 40 mg daily.  Recheck with  blood work 7/10/2024 shows normalized sodium to 135.  No other changes necessary.      7. Mixed hyperlipidemia  Assessment & Plan:  7/10/2024 total cholesterol 188, triglycerides 79, HDL 70, LDL 94.  This is continuing on atorvastatin 40 mg daily.  After initiation of atorvastatin 40 mg daily, 1/10/2024 blood work with total cholesterol 165, triglycerides 51, HDL 80, LDL 74.  Much improved with medicine.  Previous 10/10/2023 total cholesterol 263, triglycerides 87, HDL 72 and , 9/28/2019 total cholesterol 177, triglycerides 35, HDL 79, LDL 91 when she was on atorvastatin 40 mg daily.  Comparison 7/13/2019 with total cholesterol 321, triglycerides 49, HDL 78,  off medicine.  Continue atorvastatin 40 mg daily.  Recommendation ongoing healthy dietary intake, activity level, weight loss.  Monitor blood work at minimum yearly.  Advise concerns.       8. Overweight (BMI 25.0-29.9)  Assessment & Plan:  Modestly overweight, still quite a healthy weight as per body frame. Nonetheless reinforced importance of healthy diet, exercise and even modest weight loss.       9. Vitamin D deficiency  Assessment & Plan:  Current on vitamin D 1000 units daily OTC.  Vitamin D 25-hydroxy level in good range at 66.5 on 7/10/2025, 62.0 on 10/10/2023 while taking vitamin D 1000 units daily.  Continue vitamin D 1000 units daily.          Follow Up:   Return in about 6 months (around 8/3/2025) for Medicare Wellness.      Gino Boggs MD  [facility]

## 2025-02-03 NOTE — ASSESSMENT & PLAN NOTE
Infrequent flare without any sticking sensation in the throat. uses over-the-counter medication with benefit. Reflux precautions reinforced.  She does have some caution that she may flare her symptoms with initiation of alendronate as of 2/3/2025 visit , we will monitor closely.

## 2025-02-03 NOTE — ASSESSMENT & PLAN NOTE
With modest increase blood pressure 10/10/2023, good response with increase of lisinopril 20 mg daily to lisinopril 20/HCTZ 12.5 mg daily, but due to lower sodium on 1/10/2004, lisinopril/HCTZ 20/12.5 switched over to lisinopril 40 mg daily with good control.  Continue medication unchanged.  EKG non-concerning on 7/10/2024, unchanged compared to 10/10/2023, 6/30/2022, 12/14/2020, 4/8/2019.  No associated lightheadedness, dizziness, chest pain or palpitation.  No new concerns as of 2/3/2025.

## 2025-02-03 NOTE — ASSESSMENT & PLAN NOTE
Diagnosis with hemoglobin A1c 5.9% on 10/10/2023.  With some slack in diet and 6 pound weight gain as of 2/3/2025, hemoglobin A1c increased slightly to 5.7%, but still overall good range.  Previous 7/10/2024 at 5.5%, 5.7% on 1/10/2024, and 5.9% on diagnosis 10/10/2023. Continue healthy diet exercise. Caution polyuria and polydipsia signs of worsening. Recheck hemoglobin A1c every 6 months.

## 2025-03-07 DIAGNOSIS — I10 ESSENTIAL HYPERTENSION: ICD-10-CM

## 2025-03-07 RX ORDER — LISINOPRIL 40 MG/1
40 TABLET ORAL DAILY
Qty: 90 TABLET | Refills: 1 | Status: SHIPPED | OUTPATIENT
Start: 2025-03-07

## 2025-04-08 DIAGNOSIS — M81.0 AGE-RELATED OSTEOPOROSIS WITHOUT CURRENT PATHOLOGICAL FRACTURE: ICD-10-CM

## 2025-04-08 RX ORDER — ALENDRONATE SODIUM 70 MG/1
70 TABLET ORAL
Qty: 12 TABLET | Refills: 3 | Status: SHIPPED | OUTPATIENT
Start: 2025-04-08

## 2025-04-08 NOTE — TELEPHONE ENCOUNTER
Caller: Jordi Sowmya    Relationship: Self    Best call back number: [phone]     Requested Prescriptions:   Requested Prescriptions     Pending Prescriptions Disp Refills    alendronate (Fosamax) 70 MG tablet 12 tablet 3     Sig: Take 1 tablet by mouth Every 7 (Seven) Days.        Pharmacy where request should be sent: [facility]Y #[id number] - [place], [place] - [facility] [address] AT NEXT TO [facility] - [phone]  - [phone]      Last office visit with prescribing clinician: 2/3/2025   Last telemedicine visit with prescribing clinician: Visit date not found   Next office visit with prescribing clinician: 8/5/2025     Additional details provided by patient: PT IS OUT OF MEDICATION.    Does the patient have less than a 3 day supply:  [x] Yes  [] No    Would you like a call back once the refill request has been completed: [] Yes [x] No    If the office needs to give you a call back, can they leave a voicemail: [] Yes [x] No    Raheem Ayon Rep   04/08/25 10:23 EDT

## 2025-07-01 DIAGNOSIS — Z12.31 ENCOUNTER FOR SCREENING MAMMOGRAM FOR MALIGNANT NEOPLASM OF BREAST: Primary | ICD-10-CM

## 2025-07-24 ENCOUNTER — EXTERNAL PBMM DATA (OUTPATIENT)
Dept: PHARMACY | Facility: OTHER | Age: 70
End: 2025-07-24
Payer: MEDICARE

## 2025-07-31 ENCOUNTER — OFFICE VISIT (OUTPATIENT)
Dept: FAMILY MEDICINE CLINIC | Facility: CLINIC | Age: 70
End: 2025-07-31
Payer: MEDICARE

## 2025-07-31 VITALS
SYSTOLIC BLOOD PRESSURE: 140 MMHG | RESPIRATION RATE: 18 BRPM | HEART RATE: 74 BPM | BODY MASS INDEX: 27.19 KG/M2 | HEIGHT: 61 IN | TEMPERATURE: 98.6 F | WEIGHT: 144 LBS | OXYGEN SATURATION: 97 % | DIASTOLIC BLOOD PRESSURE: 92 MMHG

## 2025-07-31 DIAGNOSIS — J20.9 ACUTE BRONCHITIS, UNSPECIFIED ORGANISM: ICD-10-CM

## 2025-07-31 DIAGNOSIS — J45.21 MILD INTERMITTENT ASTHMA WITH EXACERBATION: ICD-10-CM

## 2025-07-31 DIAGNOSIS — B34.9 VIRAL SYNDROME: Primary | ICD-10-CM

## 2025-07-31 DIAGNOSIS — J30.1 SEASONAL ALLERGIC RHINITIS DUE TO POLLEN: ICD-10-CM

## 2025-07-31 LAB
EXPIRATION DATE: NORMAL
FLUAV AG UPPER RESP QL IA.RAPID: NOT DETECTED
FLUBV AG UPPER RESP QL IA.RAPID: NOT DETECTED
INTERNAL CONTROL: NORMAL
Lab: NORMAL
SARS-COV-2 AG UPPER RESP QL IA.RAPID: NOT DETECTED

## 2025-07-31 RX ORDER — CETIRIZINE HYDROCHLORIDE 10 MG/1
10 TABLET ORAL DAILY
Qty: 30 TABLET | Refills: 3 | Status: SHIPPED | OUTPATIENT
Start: 2025-07-31

## 2025-07-31 RX ORDER — ALBUTEROL SULFATE 90 UG/1
2 INHALANT RESPIRATORY (INHALATION) EVERY 4 HOURS PRN
Qty: 18 G | Refills: 2 | Status: SHIPPED | OUTPATIENT
Start: 2025-07-31

## 2025-07-31 RX ORDER — AZITHROMYCIN 250 MG/1
TABLET, FILM COATED ORAL
Qty: 6 TABLET | Refills: 0 | Status: SHIPPED | OUTPATIENT
Start: 2025-07-31

## 2025-07-31 RX ORDER — PREDNISONE 10 MG/1
30 TABLET ORAL DAILY
Qty: 15 TABLET | Refills: 0 | Status: SHIPPED | OUTPATIENT
Start: 2025-07-31 | End: 2025-08-05

## 2025-07-31 RX ORDER — INHALER, ASSIST DEVICES
SPACER (EA) MISCELLANEOUS
Qty: 1 EACH | Refills: 0 | Status: SHIPPED | OUTPATIENT
Start: 2025-07-31 | End: 2026-07-31

## 2025-07-31 NOTE — ASSESSMENT & PLAN NOTE
Seasonal pattern with modest benefit over-the-counter antihistamine, typically Zyrtec. Some breakthrough symptoms, I have added Singulair 10 mg tablet daily to regimen and she could also use Flonase although sometimes it is irritating.  As of visit 7/31/2025 felt to be more of a viral trigger for asthma, but with some underlying allergy symptoms recommended resumption of Zyrtec for the next week or 2 then as needed.  Additional benefit of saline spray, nasal flushing.  Advise concerns.

## 2025-07-31 NOTE — PROGRESS NOTES
"    Office Note     Name: Sowmya Bacon    : 1955     MRN: [id number]     Chief Complaint  Cough and Nasal Congestion    Subjective     History of Present Illness:  Sowmya Bacon is a 70 y.o. female who presents today for acute visit.  Onset about 2 weeks ago of some congestion drainage, hoarse voice where she had no fevers or chills but was just a little bit tired.  Persisting and slightly progressed symptoms over the first for 5 days, with maybe a little chest tightness on and off but not too bothersome.  Over the last week slight progression that regard but she is feeling a bit better but cannot get rid of the cough in the sense of diffuse tightness.  No localization.  No fevers or chills.  Nonetheless she does feel a little bit more tired over the last few days, a little bit more of a congested pattern of drainage and cough.  No new fevers or chills.  No nausea vomiting or diarrhea.  Regarding allergies possibly some underlying pattern of the last few weeks which she is not been using her medicine.    Review of Systems    Objective     Past Medical History:   Diagnosis Date    Cataract removed    Hyperlipidemia     Hypertension      Past Surgical History:   Procedure Laterality Date    EYE SURGERY  cataracts removed    LUMBAR FUSION      SPINE SURGERY       Family History   Problem Relation Age of Onset    Cancer Mother     Arthritis Mother     Heart disease Father        Vital Signs  /92 (BP Location: Left arm, Patient Position: Sitting, Cuff Size: Adult)   Pulse 74   Temp 98.6 °F (37 °C) (Temporal)   Resp 18   Ht 154.9 cm (61\")   Wt 65.3 kg (144 lb)   SpO2 97%   BMI 27.21 kg/m²   Estimated body mass index is 27.21 kg/m² as calculated from the following:    Height as of this encounter: 154.9 cm (61\").    Weight as of this encounter: 65.3 kg (144 lb).    Physical Exam  Constitutional:       General: She is not in acute distress.     Appearance: Normal appearance. She is not ill-appearing, " toxic-appearing or diaphoretic.   HENT:      Right Ear: Ear canal and external ear normal.      Left Ear: Ear canal and external ear normal.      Ears:      Comments: Mild fluid behind the TMs bilaterally, otherwise clear     Nose: Rhinorrhea present.      Comments: Mild to mod clear rhinorrhea     Mouth/Throat:      Mouth: Mucous membranes are moist.      Pharynx: Oropharynx is clear. No oropharyngeal exudate or posterior oropharyngeal erythema.   Cardiovascular:      Rate and Rhythm: Normal rate and regular rhythm.      Pulses: Normal pulses.      Heart sounds: Normal heart sounds. No murmur heard.     No friction rub. No gallop.   Pulmonary:      Effort: Pulmonary effort is normal. No respiratory distress.      Breath sounds: No stridor. Wheezing present.      Comments: Nonlabored breathing, good airflow at rest.  With increased effort there is a mild prolongation expiratory phase and some scattered end expiratory wheezes diffusely.  No localization of any diminished breath sounds nor adventitious breath sounds otherwise.  Musculoskeletal:      Cervical back: Neck supple. No tenderness.      Right lower leg: No edema.   Lymphadenopathy:      Cervical: No cervical adenopathy.   Skin:     General: Skin is warm and dry.      Capillary Refill: Capillary refill takes less than 2 seconds.   Neurological:      General: No focal deficit present.      Mental Status: She is alert and oriented to person, place, and time. Mental status is at baseline.   Psychiatric:         Mood and Affect: Mood normal.         Behavior: Behavior normal.         Thought Content: Thought content normal.         Judgment: Judgment normal.                   POCT Results (if applicable):  Results for orders placed or performed in visit on 07/31/25   POCT SARS-CoV-2 + Flu Antigen SONIA    Collection Time: 07/31/25  4:50 PM    Specimen: Swab   Result Value Ref Range    SARS Antigen Not Detected Not Detected, Presumptive Negative    Influenza A  Antigen SONIA Not Detected Not Detected    Influenza B Antigen SONIA Not Detected Not Detected    Internal Control Passed Passed    Lot Number [id number]     Expiration Date 01/23/2026             Assessment and Plan     Diagnoses and all orders for this visit:    1. Viral syndrome (Primary)  Assessment & Plan:  Onset 2 weeks ago viral syndrome which is now transition form of an asthmatic bronchitis pattern.  Nonetheless COVID and flu testing were negative.  Additional benefit of saline spray, nasal flushing.  Advised if not continuing to improve.    Orders:  -     POCT SARS-CoV-2 + Flu Antigen SONIA    2. Seasonal allergic rhinitis due to pollen  Assessment & Plan:  Seasonal pattern with modest benefit over-the-counter antihistamine, typically Zyrtec. Some breakthrough symptoms, I have added Singulair 10 mg tablet daily to regimen and she could also use Flonase although sometimes it is irritating.  As of visit 7/31/2025 felt to be more of a viral trigger for asthma, but with some underlying allergy symptoms recommended resumption of Zyrtec for the next week or 2 then as needed.  Additional benefit of saline spray, nasal flushing.  Advise concerns.    Orders:  -     cetirizine (zyrTEC) 10 MG tablet; Take 1 tablet by mouth Daily.  Dispense: 30 tablet; Refill: 3    3. Mild intermittent asthma with exacerbation  Assessment & Plan:  Patient reports some intermittent asthmatic tendency on and off for many years but nothing consistent.  With current viral trigger couple weeks ago she was doing well but over the last handful of days some increasing sense of mild chest tightness diffusely, consistent with asthmatic response also seen on exam with some prolongation expiratory phase and some scattered fine end expiratory wheezes.  Initiate prednisone 10 mg tablet 3 tablets daily x 5 days with albuterol inhaler 2 puffs every 4-6 hours scheduled over the next few days, then as needed.  For secondary consideration of bronchitis  pattern, please see that assessment plan.  Additional benefit of saline spray, nasal flushing.  Treat allergies to minimize that is a trigger.  Advise if not improving.    Orders:  -     albuterol sulfate  (90 Base) MCG/ACT inhaler; Inhale 2 puffs Every 4 (Four) Hours As Needed for Wheezing or Shortness of Air.  Dispense: 18 g; Refill: 2  -     predniSONE (DELTASONE) 10 MG tablet; Take 3 tablets by mouth Daily for 5 days.  Dispense: 15 tablet; Refill: 0  -     Spacer/Aero-Holding Chambers (AeroChamber MV) inhaler; Use as instructed  Dispense: 1 each; Refill: 0    4. Acute bronchitis, unspecified organism  Assessment & Plan:  Onset 2 weeks ago viral type illness with some progression in the last handful of days with consideration of a secondary bronchitis which could be viral or bacterial.  As such we will cover with Z-Amilcar to take as directed although I discussed that it again could be viral in nature.  Additionally treated with asthmatic response as per that assessment plan.  Advised if not seeing improvement over the next handful of days.    Orders:  -     azithromycin (Zithromax Z-Amilcar) 250 MG tablet; Take 2 tablets by mouth on day 1, then 1 tablet daily on days 2-5  Dispense: 6 tablet; Refill: 0               Vaccine Counseling:        Follow Up  Return in about 2 weeks (around 8/14/2025) for Medicare Wellness.    Gino Boggs MD

## 2025-07-31 NOTE — ASSESSMENT & PLAN NOTE
Patient reports some intermittent asthmatic tendency on and off for many years but nothing consistent.  With current viral trigger couple weeks ago she was doing well but over the last handful of days some increasing sense of mild chest tightness diffusely, consistent with asthmatic response also seen on exam with some prolongation expiratory phase and some scattered fine end expiratory wheezes.  Initiate prednisone 10 mg tablet 3 tablets daily x 5 days with albuterol inhaler 2 puffs every 4-6 hours scheduled over the next few days, then as needed.  For secondary consideration of bronchitis pattern, please see that assessment plan.  Additional benefit of saline spray, nasal flushing.  Treat allergies to minimize that is a trigger.  Advise if not improving.

## 2025-07-31 NOTE — ASSESSMENT & PLAN NOTE
Onset 2 weeks ago viral syndrome which is now transition form of an asthmatic bronchitis pattern.  Nonetheless COVID and flu testing were negative.  Additional benefit of saline spray, nasal flushing.  Advised if not continuing to improve.

## 2025-07-31 NOTE — ASSESSMENT & PLAN NOTE
Onset 2 weeks ago viral type illness with some progression in the last handful of days with consideration of a secondary bronchitis which could be viral or bacterial.  As such we will cover with Z-Amilcar to take as directed although I discussed that it again could be viral in nature.  Additionally treated with asthmatic response as per that assessment plan.  Advised if not seeing improvement over the next handful of days.

## 2025-08-07 ENCOUNTER — EXTERNAL PBMM DATA (OUTPATIENT)
Dept: PHARMACY | Facility: OTHER | Age: 70
End: 2025-08-07
Payer: MEDICARE

## 2025-08-14 ENCOUNTER — OFFICE VISIT (OUTPATIENT)
Dept: FAMILY MEDICINE CLINIC | Facility: CLINIC | Age: 70
End: 2025-08-14
Payer: MEDICARE

## 2025-08-14 VITALS
HEART RATE: 68 BPM | RESPIRATION RATE: 18 BRPM | HEIGHT: 61 IN | SYSTOLIC BLOOD PRESSURE: 128 MMHG | BODY MASS INDEX: 26.62 KG/M2 | WEIGHT: 141 LBS | DIASTOLIC BLOOD PRESSURE: 82 MMHG | TEMPERATURE: 98 F | OXYGEN SATURATION: 100 %

## 2025-08-14 DIAGNOSIS — G89.29 CHRONIC MIDLINE LOW BACK PAIN WITHOUT SCIATICA: ICD-10-CM

## 2025-08-14 DIAGNOSIS — R73.03 PREDIABETES: ICD-10-CM

## 2025-08-14 DIAGNOSIS — E55.9 VITAMIN D DEFICIENCY: ICD-10-CM

## 2025-08-14 DIAGNOSIS — K21.9 GASTROESOPHAGEAL REFLUX DISEASE WITHOUT ESOPHAGITIS: ICD-10-CM

## 2025-08-14 DIAGNOSIS — Z12.11 COLON CANCER SCREENING: ICD-10-CM

## 2025-08-14 DIAGNOSIS — I10 ESSENTIAL HYPERTENSION: ICD-10-CM

## 2025-08-14 DIAGNOSIS — R35.1 NOCTURIA: ICD-10-CM

## 2025-08-14 DIAGNOSIS — M54.50 CHRONIC MIDLINE LOW BACK PAIN WITHOUT SCIATICA: ICD-10-CM

## 2025-08-14 DIAGNOSIS — M81.0 AGE-RELATED OSTEOPOROSIS WITHOUT CURRENT PATHOLOGICAL FRACTURE: ICD-10-CM

## 2025-08-14 DIAGNOSIS — E66.3 OVERWEIGHT (BMI 25.0-29.9): ICD-10-CM

## 2025-08-14 DIAGNOSIS — Z00.01 ENCOUNTER FOR GENERAL ADULT MEDICAL EXAMINATION WITH ABNORMAL FINDINGS: Primary | ICD-10-CM

## 2025-08-14 DIAGNOSIS — J30.1 SEASONAL ALLERGIC RHINITIS DUE TO POLLEN: ICD-10-CM

## 2025-08-14 DIAGNOSIS — R53.83 OTHER FATIGUE: ICD-10-CM

## 2025-08-14 DIAGNOSIS — E78.2 MIXED HYPERLIPIDEMIA: ICD-10-CM

## 2025-08-14 LAB
EXPIRATION DATE: NORMAL
EXPIRATION DATE: NORMAL
GLUCOSE BLDC GLUCOMTR-MCNC: 106 MG/DL (ref 70–130)
HBA1C MFR BLD: 5.7 % (ref 4.5–5.7)
Lab: NORMAL
Lab: NORMAL

## 2025-08-14 RX ORDER — MONTELUKAST SODIUM 10 MG/1
10 TABLET ORAL NIGHTLY PRN
Qty: 90 TABLET | Refills: 0 | Status: SHIPPED | OUTPATIENT
Start: 2025-08-14

## 2025-08-14 RX ORDER — ATORVASTATIN CALCIUM 40 MG/1
40 TABLET, FILM COATED ORAL DAILY
Qty: 90 TABLET | Refills: 3 | Status: SHIPPED | OUTPATIENT
Start: 2025-08-14

## 2025-08-14 RX ORDER — FLUTICASONE PROPIONATE 50 MCG
2 SPRAY, SUSPENSION (ML) NASAL DAILY
Qty: 15.8 G | Refills: 3 | Status: SHIPPED | OUTPATIENT
Start: 2025-08-14

## 2025-08-14 RX ORDER — LISINOPRIL 40 MG/1
40 TABLET ORAL DAILY
Qty: 90 TABLET | Refills: 3 | Status: SHIPPED | OUTPATIENT
Start: 2025-08-14

## 2025-08-14 RX ORDER — ALENDRONATE SODIUM 70 MG/1
70 TABLET ORAL
Qty: 12 TABLET | Refills: 3 | Status: SHIPPED | OUTPATIENT
Start: 2025-08-14

## 2025-08-15 ENCOUNTER — RESULTS FOLLOW-UP (OUTPATIENT)
Dept: FAMILY MEDICINE CLINIC | Facility: CLINIC | Age: 70
End: 2025-08-15
Payer: MEDICARE

## 2025-08-15 LAB
25(OH)D3+25(OH)D2 SERPL-MCNC: 59.9 NG/ML (ref 30–100)
ALBUMIN SERPL-MCNC: 4.5 G/DL (ref 3.8–4.8)
ALP SERPL-CCNC: 71 IU/L (ref 44–121)
ALT SERPL-CCNC: 28 IU/L (ref 0–32)
APPEARANCE UR: CLEAR
AST SERPL-CCNC: 26 IU/L (ref 0–40)
BACTERIA #/AREA URNS HPF: NORMAL /[HPF]
BASOPHILS # BLD AUTO: 0.1 X10E3/UL (ref 0–0.2)
BASOPHILS NFR BLD AUTO: 1 %
BILIRUB SERPL-MCNC: 0.4 MG/DL (ref 0–1.2)
BILIRUB UR QL STRIP: NEGATIVE
BUN SERPL-MCNC: 12 MG/DL (ref 8–27)
BUN/CREAT SERPL: 16 (ref 12–28)
CALCIUM SERPL-MCNC: 9.3 MG/DL (ref 8.7–10.3)
CASTS URNS QL MICRO: NORMAL /LPF
CHLORIDE SERPL-SCNC: 100 MMOL/L (ref 96–106)
CHOLEST SERPL-MCNC: 186 MG/DL (ref 100–199)
CO2 SERPL-SCNC: 18 MMOL/L (ref 20–29)
COLOR UR: YELLOW
CREAT SERPL-MCNC: 0.76 MG/DL (ref 0.57–1)
EGFRCR SERPLBLD CKD-EPI 2021: 83 ML/MIN/1.73
EOSINOPHIL # BLD AUTO: 0.3 X10E3/UL (ref 0–0.4)
EOSINOPHIL NFR BLD AUTO: 5 %
EPI CELLS #/AREA URNS HPF: NORMAL /HPF (ref 0–10)
ERYTHROCYTE [DISTWIDTH] IN BLOOD BY AUTOMATED COUNT: 12.5 % (ref 11.7–15.4)
GLOBULIN SER CALC-MCNC: 2.1 G/DL (ref 1.5–4.5)
GLUCOSE SERPL-MCNC: 86 MG/DL (ref 70–99)
GLUCOSE UR QL STRIP: NEGATIVE
HCT VFR BLD AUTO: 38.1 % (ref 34–46.6)
HDLC SERPL-MCNC: 68 MG/DL
HGB BLD-MCNC: 12.3 G/DL (ref 11.1–15.9)
HGB UR QL STRIP: NEGATIVE
IMM GRANULOCYTES # BLD AUTO: 0 X10E3/UL (ref 0–0.1)
IMM GRANULOCYTES NFR BLD AUTO: 0 %
KETONES UR QL STRIP: NEGATIVE
LDLC SERPL CALC-MCNC: 102 MG/DL (ref 0–99)
LEUKOCYTE ESTERASE UR QL STRIP: NEGATIVE
LYMPHOCYTES # BLD AUTO: 2.9 X10E3/UL (ref 0.7–3.1)
LYMPHOCYTES NFR BLD AUTO: 43 %
MCH RBC QN AUTO: 29.5 PG (ref 26.6–33)
MCHC RBC AUTO-ENTMCNC: 32.3 G/DL (ref 31.5–35.7)
MCV RBC AUTO: 91 FL (ref 79–97)
MICRO URNS: NORMAL
MICRO URNS: NORMAL
MONOCYTES # BLD AUTO: 0.7 X10E3/UL (ref 0.1–0.9)
MONOCYTES NFR BLD AUTO: 10 %
NEUTROPHILS # BLD AUTO: 2.8 X10E3/UL (ref 1.4–7)
NEUTROPHILS NFR BLD AUTO: 41 %
NITRITE UR QL STRIP: NEGATIVE
PH UR STRIP: 6.5 [PH] (ref 5–7.5)
PLATELET # BLD AUTO: 351 X10E3/UL (ref 150–450)
POTASSIUM SERPL-SCNC: 4.5 MMOL/L (ref 3.5–5.2)
PROT SERPL-MCNC: 6.6 G/DL (ref 6–8.5)
PROT UR QL STRIP: NEGATIVE
RBC # BLD AUTO: 4.17 X10E6/UL (ref 3.77–5.28)
RBC #/AREA URNS HPF: NORMAL /HPF (ref 0–2)
SODIUM SERPL-SCNC: 133 MMOL/L (ref 134–144)
SP GR UR STRIP: 1.01 (ref 1–1.03)
TRIGL SERPL-MCNC: 86 MG/DL (ref 0–149)
TSH SERPL DL<=0.005 MIU/L-ACNC: 2.48 UIU/ML (ref 0.45–4.5)
URINALYSIS REFLEX: NORMAL
UROBILINOGEN UR STRIP-MCNC: 0.2 MG/DL (ref 0.2–1)
VLDLC SERPL CALC-MCNC: 16 MG/DL (ref 5–40)
WBC # BLD AUTO: 6.7 X10E3/UL (ref 3.4–10.8)
WBC #/AREA URNS HPF: NORMAL /HPF (ref 0–5)